# Patient Record
Sex: FEMALE
[De-identification: names, ages, dates, MRNs, and addresses within clinical notes are randomized per-mention and may not be internally consistent; named-entity substitution may affect disease eponyms.]

---

## 2019-04-18 ENCOUNTER — HOSPITAL ENCOUNTER (INPATIENT)
Dept: HOSPITAL 42 - ED | Age: 69
LOS: 1 days | Discharge: HOME | DRG: 86 | End: 2019-04-19
Attending: INTERNAL MEDICINE | Admitting: INTERNAL MEDICINE
Payer: MEDICARE

## 2019-04-18 VITALS — BODY MASS INDEX: 28.5 KG/M2

## 2019-04-18 DIAGNOSIS — M48.02: ICD-10-CM
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
## 2019-04-18 DIAGNOSIS — S02.2XXA: ICD-10-CM

## 2019-04-18 DIAGNOSIS — E78.5: ICD-10-CM

## 2019-04-18 DIAGNOSIS — S00.511A: ICD-10-CM

## 2019-04-18 DIAGNOSIS — S00.81XA: ICD-10-CM

## 2019-04-18 DIAGNOSIS — S01.21XA: ICD-10-CM

## 2019-04-18 DIAGNOSIS — Z79.890: ICD-10-CM

## 2019-04-18 DIAGNOSIS — K51.20: ICD-10-CM

## 2019-04-18 DIAGNOSIS — M43.12: ICD-10-CM

## 2019-04-18 DIAGNOSIS — I10: ICD-10-CM

## 2019-04-18 DIAGNOSIS — S06.6X0A: Primary | ICD-10-CM

## 2019-04-18 DIAGNOSIS — E03.9: ICD-10-CM

## 2019-04-18 LAB
ALBUMIN SERPL-MCNC: 4.4 G/DL (ref 3–4.8)
ALBUMIN/GLOB SERPL: 1.2 {RATIO} (ref 1.1–1.8)
ALT SERPL-CCNC: 18 U/L (ref 7–56)
APTT BLD: 34.5 SECONDS (ref 26.9–38.3)
AST SERPL-CCNC: 44 U/L (ref 14–36)
BASOPHILS # BLD AUTO: 0.01 K/MM3 (ref 0–2)
BASOPHILS NFR BLD: 0.1 % (ref 0–3)
BUN SERPL-MCNC: 23 MG/DL (ref 7–21)
CALCIUM SERPL-MCNC: 9.8 MG/DL (ref 8.4–10.5)
EOSINOPHIL # BLD: 0 10*3/UL (ref 0–0.7)
EOSINOPHIL NFR BLD: 0.4 % (ref 1.5–5)
ERYTHROCYTE [DISTWIDTH] IN BLOOD BY AUTOMATED COUNT: 12.9 % (ref 11.5–14.5)
GFR NON-AFRICAN AMERICAN: > 60
HGB BLD-MCNC: 12.5 G/DL (ref 12–16)
INR PPP: 1.08
LYMPHOCYTES # BLD: 1.4 10*3/UL (ref 1.2–3.4)
LYMPHOCYTES NFR BLD AUTO: 12.6 % (ref 22–35)
MCH RBC QN AUTO: 27.1 PG (ref 25–35)
MCHC RBC AUTO-ENTMCNC: 32.4 G/DL (ref 31–37)
MCV RBC AUTO: 83.7 FL (ref 80–105)
MONOCYTES # BLD AUTO: 0.5 10*3/UL (ref 0.1–0.6)
MONOCYTES NFR BLD: 4.8 % (ref 1–6)
PLATELET # BLD: 248 10^3/UL (ref 120–450)
PMV BLD AUTO: 9.9 FL (ref 7–11)
PROTHROMBIN TIME: 12 SECONDS (ref 9.4–12.5)
RBC # BLD AUTO: 4.61 10^6/UL (ref 3.5–6.1)
WBC # BLD AUTO: 10.8 10^3/UL (ref 4.5–11)

## 2019-04-18 PROCEDURE — 0HQ1XZZ REPAIR FACE SKIN, EXTERNAL APPROACH: ICD-10-PCS

## 2019-04-18 NOTE — CT
Date of service: 



04/18/2019



PROCEDURE:  CT Cervical Spine without contrast



HISTORY:

fall



COMPARISON:

None available.



TECHNIQUE:

Axial computed tomography images were obtained of the cervical spine 

without the use of intravenous contrast. Coronal and sagittal 

reformatted images were created and reviewed.



Radiation dose:



Total exam DLP = 464.61 mGy-cm.



This CT exam was performed using one or more of the following dose 

reduction techniques: Automated exposure control, adjustment of the 

mA and/or kV according to patient size, and/or use of iterative 

reconstruction technique.



FINDINGS:



VERTEBRAE:

There is a reversal of the cervical curvature in part due to multiple 

anterolistheses, grade 1 at the C3-4 and grade 2 at C4-5 with minimal 

retrolisthesis at C5-6.  There is advanced multilevel facet 

arthropathy which appears to be the etiology at these levels.  No 

fractures identified.  There is an additional anterolisthesis of C7 

anterior to T1, grade 1.  Multilevel spondylosis is identified though 

mild.  Gross endplate degenerative sclerosis is identified at C4-5 

with vacuum disc changes accompanying.  No destructive bony lesion is 

appreciated.



Prevertebral paraspinal soft tissues appear diffusely unremarkable.



DISCS/SPINAL CANAL/NEURAL FORAMINA:

At C2-3, there is a mild-to-moderate left neural foraminal stenosis 

caused by gross left facet joint and uncovertebral joint arthropathy 

with no significant right neural foraminal or central canal stenosis.



At C3-4, there is an additional asymmetric moderate left neural 

foraminal stenosis caused by degenerative osteophytes in the same 

distribution as above.  No significant right neural foraminal 

stenosis or central canal stenosis.  Limited grade 1 

spondylolisthesis evident. 



At C4-5, a moderate central stenosis results from grade 2 

spondylolisthesis with severe bilateral neural foraminal stenosis 

also appreciated on a degenerative basis.



At C5-6, additional minimal spondylolisthesis identified with disc 

osteophyte complex causing mild central stenosis.  Degenerative 

neural foraminal stenoses are severe at the left and moderate to 

severe at the right.



At C6-7, an irregular disc osteophyte complex is identified causing 

borderline central stenosis with moderate to severe bilateral neural 

foraminal stenoses identified on degenerative basis.



At C7-T1, no significant central canal or left neural foraminal 

stenosis.  Borderline degenerative right neural foraminal stenosis 

identified.



No gross disc herniation appreciable though MRI is more sensitive and 

can be performed for added evaluation if disc herniation is suspected 

clinically. 



OTHER FINDINGS:

None.



IMPRESSION:

No acute fracture or posttraumatic spondylolisthesis appreciated.  

The cervical curvature is reversed in part due to multilevel 

degenerative spondylolisthesis pattern as discussed above.



Multilevel degenerative neural foraminal stenoses appear advanced, 

seen worst at C6-7 where moderate to severe bilateral neural 

foraminal stenoses are identified with less severe multilevel central 

canal stenoses seen worst at C4-5 due to grade 2 spondylolisthesis 

primarily. 



Please see discussion above.

## 2019-04-18 NOTE — ED PDOC
Arrival/HPI





- General


Chief Complaint: Trauma


Time Seen by Provider: 04/18/19 15:45


Historian: Patient





- History of Present Illness


Narrative History of Present Illness (Text): 





04/18/19 16:45


69-year-old female presents today with head injury and laceration to the nose 

status post fall.  Patient states that she was rushing to get to her piano 

lesson and she tripped and fell hitting her face on the ground.  She denies loss

of consciousness.  Patient states she broke her glasses and sustained a 

laceration to the nose.  She denies fevers or chills.  No chest pain or 

shortness of breath.  She denies abdominal pain.  No nausea vomiting diarrhea or

constipation.  She denies neck or back pain. pt denies headache dizziness or 

weakness.  Patient states her last tetanus shot was 2 years ago.  











Past Medical History





- Provider Review


Nursing Documentation Reviewed: Yes





- Travel History


Have you recently traveled outside [place] w/in the past 3 mons?: No





- Infectious Disease


Hx of Infectious Diseases: None





- Tetanus Immunization


Tetanus Immunization: Up to Date





- Cardiac


Hx Hypertension: Yes





- Pulmonary


Hx Asthma: Yes





- HEENT


Hx Cataracts: Yes





- Hematological/Oncological


Other/Comment: Skin Ca - nose





- Integumentary


Other/Comment: Skin CA





- Psychiatric


Hx Substance Use: No





Family/Social History





- Physician Review


Nursing Documentation Reviewed: Yes


Family/Social History: Unknown Family HX


Smoking Status: Never Smoked


Hx Alcohol Use: No


Hx Substance Use: No





Allergies/Home Meds


Allergies/Adverse Reactions: 


Allergies





Citrus And Derivatives Allergy (Verified 09/21/18 07:52)


   RASH


grass pollen Allergy (Verified 09/21/18 07:52)


   RASH


wool Allergy (Verified 09/21/18 07:52)


   RASH








Home Medications: 


                                    Home Meds











 Medication  Instructions  Recorded  Confirmed


 


Levothyroxine [Synthroid] 50 mcg PO DAILY 11/11/16 09/21/18


 


Rosuvastatin Calcium [Crestor] 10 mg PO DAILY 11/11/16 09/21/18


 


Valsartan [Diovan] 320 mg PO DAILY 11/11/16 09/21/18


 


amLODIPine [Norvasc] 5 mg PO DAILY 11/11/16 09/21/18














Review of Systems





- Review of Systems


Constitutional: absent: Fatigue, Fevers


Eyes: absent: Vision Changes, Photophobia, Eye Pain


ENT: Sinus Congestion.  absent: Sore Throat


Respiratory: absent: SOB, Cough


Cardiovascular: absent: Chest Pain, Palpitations


Gastrointestinal: absent: Abdominal Pain, Constipation, Diarrhea, Nausea, 

Vomiting


Musculoskeletal: absent: Arthralgias, Back Pain, Neck Pain


Skin: Laceration (To bridge of the nose), Other (Abrasions)


Neurological: absent: Headache, Dizziness


Psychiatric: absent: Anxiety, Depression





Physical Exam


Vital Signs Reviewed: Yes


Temperature: Afebrile


Blood Pressure: Normal


Pulse: Regular


Respiratory Rate: Normal


Appearance: Positive for: Well-Appearing, Non-Toxic, Comfortable


Pain Distress: None


Mental Status: Positive for: Alert and Oriented X 3





- Systems Exam


Head: Present: Abrasion (Abrasion to the upper lip just inferior to the left 

nasal opening.   abrasion to the left side of the forehead.).  No: Tenderness, 

Swelling, Ecchymosis


Pupils: Present: PERRL


Extroacular Muscles: Present: EOMI


Conjunctiva: Present: Normal


Ears: Present: Normal, NORMAL TM


Mouth: Present: Moist Mucous Membranes, Normal Lips, Normal Tounge.  No: 

Drooling, Trismus


Pharnyx: Present: Normal.  No: ERYTHEMA


Nose (External): Present: Laceration (2.5cm laceration and abrasion to bridge of

 nose. + edema. ).  No: Atraumatic


Nose (Internal): Present: Normal Inspection, No Active Bleeding.  No: Engorged, 

Clear Mucous, Rhinorrhea, Septal Hematoma, Epistaxis


Neck: Present: Normal Range of Motion, Trachea Midline.  No: MIDLINE TENDERNESS,

 Paraspinal Tenderness


Respiratory/Chest: Present: Clear to Auscultation, Good Air Exchange.  No: 

Respiratory Distress, Accessory Muscle Use, Tender to Palpation


Cardiovascular: Present: Regular Rate and Rhythm


Abdomen: No: Tenderness, Rebound, Guarding


Back: Present: Normal Inspection.  No: Midline Tenderness, Paraspinal Tenderness


Upper Extremity: Present: Normal Inspection, Normal ROM


Lower Extremity: Present: Normal Inspection, Normal ROM.  No: Tenderness


Neurological: Present: GCS=15, Speech Normal, Motor Func Grossly Intact, Normal 

Sensory Function, Gait Normal, Memory Normal


Skin: Present: Warm, Dry, Normal Color


Psychiatric: Present: Alert, Oriented x 3





Medical Decision Making


ED Course and Treatment: 





04/18/19 16:50


69-year-old female presenting today laceration to the nose and head injury 

status post mechanical fall








Patient is nontoxic well-appearing in no distress.





Patient's tetanus is up-to-date.








CAT scan of the head:FINDINGS:


HEMORRHAGE:


Punctate hyperdensity at the level of the anterior falx (series 4, image 23); 

small subarachnoid hemorrhage must be considered. 


BRAIN:


Diffuse atrophy with prominence of the ventricles and sulci noted. No mass 

effect or edema.  Scattered periventricular and subcortical white matter 

hypodensities, which are nonspecific, but often seen with chronic microvascular 

ischemic disease. 


VENTRICLES:


No hydrocephalus. 


CALVARIUM:


Unremarkable.


PARANASAL SINUSES:


Unremarkable as visualized. No significant inflammatory changes.


MASTOID AIR CELLS:


Unremarkable as visualized. No inflammatory changes.


OTHER FINDINGS:


Deformity of the left nasal bone consistent with mildly displaced fracture. 

Associated soft tissue swelling, laceration and punctate radiopaque take foreign

 body/debris.


IMPRESSION:


Punctate hyperdensity at the level of the anterior falx; small subarachnoid 

hemorrhage must be considered. Recommend 4-6 hours follow-up CT in order to 

assess for stability or resolution. 


Deformity of the left nasal bone appears consistent with mildly displaced 

fracture. Associated soft tissue swelling, laceration, punctate radiopaque 

foreign body/debris. 








CAT scan of the maxillofacial bones:Findings: 


Streak artifact from dental hardware. Deformity of the nasal bones consistent 

with mildly displaced left nasal bone fracture and nondisplaced right nasal bone

 fracture.  Associated soft tissue swelling, laceration, punctate radiopaque 

foreign body/debris. Remainder the visualized osseous structures appear intact 

without acute displaced fracture identified.  The orbits appear unremarkable.  

The temporomandibular joints appear located.  The mastoid air cells appear 

clear.  The paranasal sinuses appear clear.    The soft tissues appear 

unremarkable. Degenerative changes of the included cervical spine. 5 mm 

anterolisthesis of C4 on C5. 


Impression: 


Streak artifact from dental hardware. Deformity of the nasal bones consistent 

with mildly displaced left nasal bone fracture and nondisplaced right nasal bone

 fracture.  Associated soft tissue swelling, laceration, punctate radiopaque 

foreign body/debris. 


5 mm anterolisthesis of C4 on C5. Correlate clinically. If indicated, recommend 

further evaluation with dedicated cross-sectional imaging. 











ct c-spine; FINDINGS:


VERTEBRAE:


There is a reversal of the cervical curvature in part due to multiple 

anterolistheses, grade 1 at the C3-4 and grade 2 at C4-5 with minimal 

retrolisthesis at C5-6.  There is advanced multilevel facet arthropathy which 

appears to be the etiology at these levels.  No fractures identified.  There is 

an additional anterolisthesis of C7 anterior to T1, grade 1.  Multilevel 

spondylosis is identified though mild.  Gross endplate degenerative sclerosis is

 identified at C4-5 with vacuum disc changes accompanying.  No destructive bony 

lesion is appreciated.


Prevertebral paraspinal soft tissues appear diffusely unremarkable.


DISCS/SPINAL CANAL/NEURAL FORAMINA:


At C2-3, there is a mild-to-moderate left neural foraminal stenosis caused by 

gross left facet joint and uncovertebral joint arthropathy with no significant 

right neural foraminal or central canal stenosis.


At C3-4, there is an additional asymmetric moderate left neural foraminal 

stenosis caused by degenerative osteophytes in the same distribution as above.  

No significant right neural foraminal stenosis or central canal stenosis.  

Limited grade 1 spondylolisthesis evident. 


At C4-5, a moderate central stenosis results from grade 2 spondylolisthesis with

 severe bilateral neural foraminal stenosis also appreciated on a degenerative 

basis.


At C5-6, additional minimal spondylolisthesis identified with disc osteophyte 

complex causing mild central stenosis.  Degenerative neural foraminal stenoses 

are severe at the left and moderate to severe at the right.


At C6-7, an irregular disc osteophyte complex is identified causing borderline 

central stenosis with moderate to severe bilateral neural foraminal stenoses 

identified on degenerative basis.


At C7-T1, no significant central canal or left neural foraminal stenosis.  

Borderline degenerative right neural foraminal stenosis identified.


No gross disc herniation appreciable though MRI is more sensitive and can be 

performed for added evaluation if disc herniation is suspected clinically. 


OTHER FINDINGS:


None.


IMPRESSION:


No acute fracture or posttraumatic spondylolisthesis appreciated.  The cervical 

curvature is reversed in part due to multilevel degenerative spondylolisthesis 

pattern as discussed above.


Multilevel degenerative neural foraminal stenoses appear advanced, seen worst at

 C6-7 where moderate to severe bilateral neural foraminal stenoses are 

identified with less severe multilevel central canal stenoses seen worst at C4-5

 due to grade 2 spondylolisthesis primarily. 


Please see discussion above.








Case was discussed with Dr. Spring (neurosurgeon) in depth.  He reviewed 

the images and advised observation and repeat CAT scan. 





Patient is nontoxic well appearing in no distress. Vital signs are stable. 

ambulating with steady gait. no neuro deficits.








all wounds cleaned and irrigated. 


nasal laceration irrigated well with high pressure irrigation.  There was no 

visualized foreign body upon inspection prior to or after high-pressure 

irrigation. 





Patient was given 1 g of Ancef IV prophylactically for nasal bone fracture with 

laceration.





Laceration repair: 7 sutures placed. 





Bacitracin and dressing applied





Case discussed with Dr. Sandhu in depth accepts admission to the ICU for every 

hour neurochecks for possible traumatic subarachnoid hemorrhage.








All results were discussed in depth with the patient.  Patient's primary care 

physician is Dr. Suarez.  Patient will be admitted to the hospitalist service.








All aspects of this case were discussed the attending of record.











Impression: Subarachnoid hemorrhage, Laceration, nose,  nasal bone fracture, 

head injury,


admit to ICU





- RAD Interpretation


Radiology Orders: 











04/18/19 15:57


HEAD W/O CONTRAST [CT] Stat 


MAXILLOFACIAL W/O CONTRAST [CT] Stat 














Procedure: Wound Repair





- Procedure


Procedure: Wound Repair: nose





- Performed by


Performed by: Mid-level Provider





- Indications


Indication(s):: Laceration





- Location


Location:: Nose


Shape:: Linear (laceration with laceration, jagged edges)


Depth:: Epidermis





- Anesthetic Technique


Local/Regional Anesthetic:: Lidocaine 1% (1cc)





- Debris


Debris:: None





- Irrigated


Irrigated with ml of normal saline: copious amounts of NS using high pressure 

irrigation





- Complexity


Complexity:: Simple (one layer)





- Wound repair method


Sutures:: # (7), Size (6.0), Type (prolene), Technique (interrupted)





- Complications


Complications: none





- Patient tolerated procedure


Patient Tolerated Procedure:: Well





Disposition/Present on Arrival





- Present on Arrival


Any Indicators Present on Arrival: No


History of DVT/PE: No


History of Uncontrolled Diabetes: No


Urinary Catheter: No


History of Decub. Ulcer: No


History Surgical Site Infection Following: None





- Disposition


Have Diagnosis and Disposition been Completed?: Yes


Diagnosis: 


 Subarachnoid hemorrhage, Nasal bone fracture, Laceration of nose





Disposition: HOSPITALIZED


Disposition Time: 16:54


Patient Plan: Admission, ICU


Patient Problems: 


                             Current Active Problems











Problem Status Onset


 


Laceration of nose Acute 


 


Nasal bone fracture Acute 


 


Subarachnoid hemorrhage Acute 











Condition: CRITICAL


Referrals: 


Romeo Suarez MD [Primary Care Provider] - Follow up with primary


Forms:  [employer] (English)

## 2019-04-18 NOTE — CP.PCM.HP
<Ramón Heath - Last Filed: 04/18/19 21:55>





History of Present Illness





- History of Present Illness


History of Present Illness: 





Hospitalist H&P for Dr. Sandhu





CC: Fall





Patient is a 68 yo F with PMH of HTN, Hypothyroidism, HLD, ulcerative proctitis,

and cataracts presents to [facility] after mechanical fall at home. Patient staets that

she was rushing to get to her piano lession and tripped. Patient fell and hit 

her face on the ground. Patient denies any LOC, dizziness, confusion, change in 

vision/hearing, neck/back pain, or weakness after the fall. Patient sustained a 

laceration to her nose. Furthermore, patient denied CP, SOB, n/v/d, abdominal 

pain, fever, chills, HA, or dizziness.





PMH: HTN, Hypothyroidism, HLD, ulcerative proctitis, cataracts


Surg: Cataract surgery, hysterectomy, skin CA removal from nose


All: Macrobid


SH: Denied EtOH, tobacco, and illicit drug use


FHx: Ulcerative colitis and Colon CA


Medications: as per MAR


PMD: Dedousis








Present on Admission





- Present on Admission


Any Indicators Present on Admission: No





Review of Systems





- Review of Systems


All systems: reviewed and no additional remarkable complaints except (12 point 

ROS reviewed and is negative other than what is stated in HPI)





Past Patient History





- Infectious Disease


Hx of Infectious Diseases: None





- Tetanus Immunizations


Tetanus Immunization: Up to Date





- Past Social History


Smoking Status: Never Smoked





- CARDIAC


Hx Hypertension: Yes





- PULMONARY


Hx Asthma: Yes





- HEENT


Hx Cataracts: Yes





- HEMATOLOGICAL/ONCOLOGICAL


Other/Comment: Skin Ca - nose





- INTEGUMENTARY


Other/Comment: Skin CA





- PSYCHIATRIC


Hx Substance Use: No





Meds


Allergies/Adverse Reactions: 


                                    Allergies











Allergy/AdvReac Type Severity Reaction Status Date / Time


 


Citrus And Derivatives Allergy  RASH Verified 09/21/18 07:52


 


grass pollen Allergy  RASH Verified 09/21/18 07:52


 


nitrofurantoin Allergy  RASH Verified 04/18/19 20:41





[From Macrobid]     


 


wool Allergy  RASH Verified 09/21/18 07:52














Physical Exam





- Constitutional


Appears: No Acute Distress





- Head Exam


Head Exam: absent: ATRAUMATIC (Abrasion upper lip, inferior to left nostril; 

abraion to left side of forehead)





- Eye Exam


Eye Exam: EOMI, Normal appearance, PERRL





- ENT Exam


ENT Exam: Mucous Membranes Moist


Additional comments: 





2.5 cm Laceration to bridge of nose with edema





- Neck Exam


Neck exam: Positive for: Normal Inspection





- Respiratory Exam


Respiratory Exam: Clear to Auscultation Bilateral.  absent: Rales, Rhonchi, 

Wheezes





- Cardiovascular Exam


Cardiovascular Exam: RRR, +S1, +S2.  absent: Diastolic murmur, Gallop, Rubs, 

Systolic Murmur





- GI/Abdominal Exam


GI & Abdominal Exam: Normal Bowel Sounds, Soft.  absent: Distended, Guarding, 

Rebound, Tenderness





- Extremities Exam


Extremities exam: Positive for: normal inspection





- Back Exam


Back exam: NORMAL INSPECTION





- Neurological Exam


Neurological exam: Alert, CN II-XII Intact, Oriented x3





- Psychiatric Exam


Psychiatric exam: Normal Affect, Normal Mood





- Skin


Skin Exam: Normal Color, Warm





Results





- Vital Signs


Recent Vital Signs: 





                                Last Vital Signs











Temp  97.9 F   04/18/19 16:56


 


Pulse  81   04/18/19 16:56


 


Resp  16   04/18/19 16:56


 


BP  110/75   04/18/19 16:56


 


Pulse Ox  100   04/18/19 16:56














- Labs


Result Diagrams: 


                                 04/18/19 19:30





                                 04/18/19 19:30


Labs: 





                         Laboratory Results - last 24 hr











  04/18/19 04/18/19 04/18/19





  19:30 19:30 19:30


 


WBC    10.8


 


RBC    4.61


 


Hgb    12.5


 


Hct    38.6


 


MCV    83.7


 


MCH    27.1


 


MCHC    32.4


 


RDW    12.9


 


Plt Count    248


 


MPV    9.9


 


Neut % (Auto)    82.1 H


 


Lymph % (Auto)    12.6 L


 


Mono % (Auto)    4.8


 


Eos % (Auto)    0.4 L


 


Baso % (Auto)    0.1


 


Lymph # (Auto)    1.4


 


Mono # (Auto)    0.5


 


Eos # (Auto)    0.0


 


Baso # (Auto)    0.01


 


Absolute Neuts (auto)    8.83 H


 


PT  12.0  


 


INR  1.08  


 


APTT  34.5  


 


Sodium   140 


 


Potassium   4.4 


 


Chloride   103 


 


Carbon Dioxide   27 


 


Anion Gap   15 


 


BUN   23 H 


 


Creatinine   0.6 L 


 


Est GFR ( Amer)   > 60 


 


Est GFR (Non-Af Amer)   > 60 


 


Random Glucose   97 


 


Calcium   9.8 


 


Total Bilirubin   0.8 


 


AST   44 H 


 


ALT   18 


 


Alkaline Phosphatase   85 


 


Total Protein   8.0 


 


Albumin   4.4 


 


Globulin   3.6 


 


Albumin/Globulin Ratio   1.2 


 


BBK History Checked   














  04/18/19





  20:21


 


WBC 


 


RBC 


 


Hgb 


 


Hct 


 


MCV 


 


MCH 


 


MCHC 


 


RDW 


 


Plt Count 


 


MPV 


 


Neut % (Auto) 


 


Lymph % (Auto) 


 


Mono % (Auto) 


 


Eos % (Auto) 


 


Baso % (Auto) 


 


Lymph # (Auto) 


 


Mono # (Auto) 


 


Eos # (Auto) 


 


Baso # (Auto) 


 


Absolute Neuts (auto) 


 


PT 


 


INR 


 


APTT 


 


Sodium 


 


Potassium 


 


Chloride 


 


Carbon Dioxide 


 


Anion Gap 


 


BUN 


 


Creatinine 


 


Est GFR ( Amer) 


 


Est GFR (Non-Af Amer) 


 


Random Glucose 


 


Calcium 


 


Total Bilirubin 


 


AST 


 


ALT 


 


Alkaline Phosphatase 


 


Total Protein 


 


Albumin 


 


Globulin 


 


Albumin/Globulin Ratio 


 


BBK History Checked  No verified bt














Assessment & Plan





- Assessment and Plan (Free Text)


Assessment: 





68 yo F with PMH of HTN, Hypothyroidism, HLD, ulcerative proctitis, and 

cataracts presents to ED s/p mechanical fall. Patient will be admitted to ICU 

for evaluation and treatment for possible SAH found on CT requiring q1h 

neurochecks.





Plan: 





1. Mechanical Fall


- Admit to ICU for q1h neurochecks


- Head CT showed punctate hyperdensity at the level of anterior falx, cannot r/o

SAH


- Head CT ordered in AM; r/o SAH


- Cervical CT showed no fracture or posttraumatic spondylolisthesis, multilevel 

degenerative neural foraminal stenoses, grade 2 spondylolisthesis C4-5


- Maxillofacial CT showed mildly displaced left nasal bone fracture and 

nondisplaced right nasal bone fracture, possible foreign body


- Nasal laceration was irrigated, cleaned, and sutured in ED without evidence of

foreign body


- Remove sutures in 5-7 days


- Cont Ancef for foreign body ppx


- ENT follow up outpatient


- Neurology Consulted


- Neurosurgery Consulted





2. HTN


- Cont Norvasc and Valsartan





3. Hypothyroidism


- Cont Levothyroxine





4. HLD


- Cont Lipitor





GI/DVT PPx


- Protonix


- SCDs





Patient discussed in detail with Dr. Sandhu.


Jason Heath, DO PGY2








<Kamala Sandhu - Last Filed: 04/19/19 19:58>





Results





- Vital Signs


Recent Vital Signs: 





                                Last Vital Signs











Temp  98.6 F   04/19/19 07:00


 


Pulse  77   04/19/19 16:00


 


Resp  19   04/19/19 16:00


 


BP  119/73   04/19/19 16:00


 


Pulse Ox  98   04/19/19 15:00














- Labs


Result Diagrams: 


                                 04/19/19 05:10





                                 04/19/19 05:10


Labs: 





                         Laboratory Results - last 24 hr











  04/18/19 04/18/19 04/18/19





  19:30 19:30 20:21


 


WBC   


 


RBC   


 


Hgb   


 


Hct   


 


MCV   


 


MCH   


 


MCHC   


 


RDW   


 


Plt Count   


 


MPV   


 


Neut % (Auto)   


 


Lymph % (Auto)   


 


Mono % (Auto)   


 


Eos % (Auto)   


 


Baso % (Auto)   


 


Lymph # (Auto)   


 


Mono # (Auto)   


 


Eos # (Auto)   


 


Baso # (Auto)   


 


Absolute Neuts (auto)   


 


PT  12.0  


 


INR  1.08  


 


APTT  34.5  


 


Sodium   140 


 


Potassium   4.4 


 


Chloride   103 


 


Carbon Dioxide   27 


 


Anion Gap   15 


 


BUN   23 H 


 


Creatinine   0.6 L 


 


Est GFR ( Amer)   > 60 


 


Est GFR (Non-Af Amer)   > 60 


 


Random Glucose   97 


 


Calcium   9.8 


 


Magnesium   


 


Total Bilirubin   0.8 


 


AST   44 H 


 


ALT   18 


 


Alkaline Phosphatase   85 


 


Total Protein   8.0 


 


Albumin   4.4 


 


Globulin   3.6 


 


Albumin/Globulin Ratio   1.2 


 


25-OH Vitamin D Total   


 


Blood Type    B POSITIVE


 


Blood Type Confirm   


 


Antibody Screen    Negative


 


BBK History Checked    No verified bt














  04/18/19 04/19/19 04/19/19





  20:40 05:10 05:10


 


WBC   7.1  D 


 


RBC   4.34 


 


Hgb   11.5 L 


 


Hct   36.2 


 


MCV   83.4 


 


MCH   26.5 


 


MCHC   31.8 


 


RDW   12.9 


 


Plt Count   249 


 


MPV   9.6 


 


Neut % (Auto)   73.4 H 


 


Lymph % (Auto)   20.3 L 


 


Mono % (Auto)   5.5 


 


Eos % (Auto)   0.7 L 


 


Baso % (Auto)   0.1 


 


Lymph # (Auto)   1.4 


 


Mono # (Auto)   0.4 


 


Eos # (Auto)   0.1 


 


Baso # (Auto)   0.01 


 


Absolute Neuts (auto)   5.19 


 


PT   


 


INR   


 


APTT   


 


Sodium    140


 


Potassium    3.6


 


Chloride    101


 


Carbon Dioxide    30


 


Anion Gap    13


 


BUN    16


 


Creatinine    0.6 L


 


Est GFR ( Amer)    > 60


 


Est GFR (Non-Af Amer)    > 60


 


Random Glucose    102


 


Calcium    9.5


 


Magnesium    2.1


 


Total Bilirubin    0.7


 


AST    44 H


 


ALT    23


 


Alkaline Phosphatase    87


 


Total Protein    7.3


 


Albumin    4.1


 


Globulin    3.3


 


Albumin/Globulin Ratio    1.2


 


25-OH Vitamin D Total   


 


Blood Type   


 


Blood Type Confirm  B POSITIVE  


 


Antibody Screen   


 


BBK History Checked   














  04/19/19





  10:30


 


WBC 


 


RBC 


 


Hgb 


 


Hct 


 


MCV 


 


MCH 


 


MCHC 


 


RDW 


 


Plt Count 


 


MPV 


 


Neut % (Auto) 


 


Lymph % (Auto) 


 


Mono % (Auto) 


 


Eos % (Auto) 


 


Baso % (Auto) 


 


Lymph # (Auto) 


 


Mono # (Auto) 


 


Eos # (Auto) 


 


Baso # (Auto) 


 


Absolute Neuts (auto) 


 


PT 


 


INR 


 


APTT 


 


Sodium 


 


Potassium 


 


Chloride 


 


Carbon Dioxide 


 


Anion Gap 


 


BUN 


 


Creatinine 


 


Est GFR ( Amer) 


 


Est GFR (Non-Af Amer) 


 


Random Glucose 


 


Calcium 


 


Magnesium 


 


Total Bilirubin 


 


AST 


 


ALT 


 


Alkaline Phosphatase 


 


Total Protein 


 


Albumin 


 


Globulin 


 


Albumin/Globulin Ratio 


 


25-OH Vitamin D Total  22.8 L


 


Blood Type 


 


Blood Type Confirm 


 


Antibody Screen 


 


BBK History Checked 














Attending/Attestation





- Attestation


I have personally seen and examined this patient.: Yes


I have fully participated in the care of the patient.: Yes


I have reviewed all pertinent clinical information: Yes


Notes (Text): 





04/19/19 19:57


seen and examined. Discussed with resident. A&P as above. Ct shows foreign 

object in nose. pt. needs to F/U plastic surgeon as O/P.

## 2019-04-18 NOTE — CT
Date of service: 04/18/2019



CT maxillofacial bones without IV contrast 



Indication: fall, facial injury, nasal laceration



Comparison: Noncontrast head CT performed the same day.



Technique: 



Axial computed tomography images were obtained of the maxillofacial 

bones without the use of intravenous contrast. Coronal and sagittal 

reformatted images were generated and reviewed. 



This CT exam was performed using 1 or more of the following dose 

reduction techniques: Automated exposure control, adjustment of the 

MAA and/or kV according to patient size, and/or use of iterative 

reconstruction technique. 



Radiation dose:



Total exam DLP = 706.96 mGy-cm.



Findings: 



Streak artifact from dental hardware. Deformity of the nasal bones 

consistent with mildly displaced left nasal bone fracture and 

nondisplaced right nasal bone fracture.  Associated soft tissue 

swelling, laceration, punctate radiopaque foreign body/debris. 

Remainder the visualized osseous structures appear intact without 

acute displaced fracture identified.  The orbits appear unremarkable. 

 The temporomandibular joints appear located.  The mastoid air cells 

appear clear.  The paranasal sinuses appear clear.    The soft 

tissues appear unremarkable. Degenerative changes of the included 

cervical spine. 5 mm anterolisthesis of C4 on C5. 



Impression: 



Streak artifact from dental hardware. Deformity of the nasal bones 

consistent with mildly displaced left nasal bone fracture and 

nondisplaced right nasal bone fracture.  Associated soft tissue 

swelling, laceration, punctate radiopaque foreign body/debris. 



5 mm anterolisthesis of C4 on C5. Correlate clinically. If indicated, 

recommend further evaluation with dedicated cross-sectional imaging. 



Findings discussed with YUAN Diez on 4/18/19 at 6:05 p.m.

## 2019-04-18 NOTE — CT
Date of service: 04/18/2019



PROCEDURE:  CT HEAD WITHOUT CONTRAST.



HISTORY:

fall, head injury



COMPARISON:

None available



TECHNIQUE:

Axial computed tomography images were obtained through the head/brain 

without intravenous contrast.  



Radiation dose:



Total exam DLP = 852.55 mGy-cm.



This CT exam was performed using one or more of the following dose 

reduction techniques: Automated exposure control, adjustment of the 

mA and/or kV according to patient size, and/or use of iterative 

reconstruction technique.



FINDINGS:



HEMORRHAGE:

Punctate hyperdensity at the level of the anterior falx (series 4, 

image 23); small subarachnoid hemorrhage must be considered. 



BRAIN:

Diffuse atrophy with prominence of the ventricles and sulci noted. No 

mass effect or edema.  Scattered periventricular and subcortical 

white matter hypodensities, which are nonspecific, but often seen 

with chronic microvascular ischemic disease. 



VENTRICLES:

No hydrocephalus. 



CALVARIUM:

Unremarkable.



PARANASAL SINUSES:

Unremarkable as visualized. No significant inflammatory changes.



MASTOID AIR CELLS:

Unremarkable as visualized. No inflammatory changes.



OTHER FINDINGS:

Deformity of the left nasal bone consistent with mildly displaced 

fracture. Associated soft tissue swelling, laceration and punctate 

radiopaque take foreign body/debris.



IMPRESSION:

Punctate hyperdensity at the level of the anterior falx; small 

subarachnoid hemorrhage must be considered. Recommend 4-6 hours 

follow-up CT in order to assess for stability or resolution. 



Deformity of the left nasal bone appears consistent with mildly 

displaced fracture. Associated soft tissue swelling, laceration, 

punctate radiopaque foreign body/debris. 



Findings discussed with YUAN Diez on 4/18/19 at 5:47 p.m.

## 2019-04-19 VITALS
SYSTOLIC BLOOD PRESSURE: 119 MMHG | HEART RATE: 77 BPM | OXYGEN SATURATION: 98 % | DIASTOLIC BLOOD PRESSURE: 73 MMHG | RESPIRATION RATE: 19 BRPM

## 2019-04-19 VITALS — TEMPERATURE: 98.6 F

## 2019-04-19 LAB
ALBUMIN SERPL-MCNC: 4.1 G/DL (ref 3–4.8)
ALBUMIN/GLOB SERPL: 1.2 {RATIO} (ref 1.1–1.8)
ALT SERPL-CCNC: 23 U/L (ref 7–56)
AST SERPL-CCNC: 44 U/L (ref 14–36)
BASOPHILS # BLD AUTO: 0.01 K/MM3 (ref 0–2)
BASOPHILS NFR BLD: 0.1 % (ref 0–3)
BUN SERPL-MCNC: 16 MG/DL (ref 7–21)
CALCIUM SERPL-MCNC: 9.5 MG/DL (ref 8.4–10.5)
EOSINOPHIL # BLD: 0.1 10*3/UL (ref 0–0.7)
EOSINOPHIL NFR BLD: 0.7 % (ref 1.5–5)
ERYTHROCYTE [DISTWIDTH] IN BLOOD BY AUTOMATED COUNT: 12.9 % (ref 11.5–14.5)
GFR NON-AFRICAN AMERICAN: > 60
HGB BLD-MCNC: 11.5 G/DL (ref 12–16)
LYMPHOCYTES # BLD: 1.4 10*3/UL (ref 1.2–3.4)
LYMPHOCYTES NFR BLD AUTO: 20.3 % (ref 22–35)
MCH RBC QN AUTO: 26.5 PG (ref 25–35)
MCHC RBC AUTO-ENTMCNC: 31.8 G/DL (ref 31–37)
MCV RBC AUTO: 83.4 FL (ref 80–105)
MONOCYTES # BLD AUTO: 0.4 10*3/UL (ref 0.1–0.6)
MONOCYTES NFR BLD: 5.5 % (ref 1–6)
PLATELET # BLD: 249 10^3/UL (ref 120–450)
PMV BLD AUTO: 9.6 FL (ref 7–11)
RBC # BLD AUTO: 4.34 10^6/UL (ref 3.5–6.1)
WBC # BLD AUTO: 7.1 10^3/UL (ref 4.5–11)

## 2019-04-19 RX ADMIN — CEFAZOLIN SCH MLS/HR: 330 INJECTION, POWDER, FOR SOLUTION INTRAMUSCULAR; INTRAVENOUS at 06:45

## 2019-04-19 RX ADMIN — CEFAZOLIN SCH MLS/HR: 330 INJECTION, POWDER, FOR SOLUTION INTRAMUSCULAR; INTRAVENOUS at 14:44

## 2019-04-19 NOTE — CP.PCM.PCO
Physician Communication Note





- Physician Communication Note


Physician Communication Note: Pt. cleared from neurosurgery, repeat head ct neg 

hemorrage,ent consult pen

## 2019-04-19 NOTE — CP.CCUPN
<JamesKana - Last Filed: 04/19/19 10:25>





CCU Subjective





- Physician Review


Events Since Last Encounter (Free Text): 





04/19/19 07:12


no acute events overnight


Subjective (Free Text): 





04/19/19 07:15


Pt seen and examined this morning, pt denies headaches, nausea, vomiting, 

dizziness, visual changes





CCU Objective





- Vital Signs / Intake & Output


Vital Signs (Last 4 hours): 


Vital Signs











  Temp Pulse Resp BP Pulse Ox


 


 04/19/19 04:00  98.3 F  74  20  115/73  99











Intake and Output (Last 8hrs): 


                                 Intake & Output











 04/18/19 04/19/19 04/19/19





 22:59 06:59 14:59


 


Weight 156 lb  














- Physical Exam


Physical Exam Limitations: Negative for: Altered Mental Status


Head: Positive for: Atraumatic, Normocephalic, Abrasion (Abrasion to the upper 

lip just inferior to the left nasal opening.   abrasion to the left side of the 

forehead.).  Negative for: Tenderness, Swelling, Ecchymosis


Pupils: Positive for: PERRL


Extroacular Muscles: Positive for: EOMI


Conjunctiva: Positive for: Normal


Ears: Positive for: Normal, NORMAL TM


Mouth: Positive for: Moist Mucous Membranes, Normal Lips, Normal Tounge.  

Negative for: Drooling, Trismus


Pharnyx: Positive for: Normal.  Negative for: ERYTHEMA


Nose (External): Positive for: Laceration (2.5cm laceration and abrasion to 

bridge of nose. + edema. ).  Negative for: Atraumatic


Nose (Internal): Positive for: Normal Inspection, No Active Bleeding.  Negative 

for: Engorged, Clear Mucous, Rhinorrhea, Septal Hematoma, Epistaxis


Neck: Positive for: Normal Range of Motion, Trachea Midline.  Negative for: 

MIDLINE TENDERNESS, Paraspinal Tenderness


Respiratory/Chest: Positive for: Clear to Auscultation, Good Air Exchange.  

Negative for: Respiratory Distress, Accessory Muscle Use, Tender to Palpation


Cardiovascular: Positive for: Regular Rate and Rhythm


Abdomen: Negative for: Tenderness, Rebound, Guarding


Back: Positive for: Normal Inspection.  Negative for: Midline Tenderness, 

Paraspinal Tenderness


Upper Extremity: Positive for: Normal Inspection, Normal ROM


Lower Extremity: Positive for: Normal Inspection, Normal ROM.  Negative for: 

Tenderness


Neurological: Positive for: GCS=15, CN II-XII Intact, Speech Normal, Motor Func 

Grossly Intact, Normal Sensory Function, Gait Normal, Memory Normal


Skin: Positive for: Warm, Dry, Normal Color


Psychiatric: Positive for: Alert, Oriented x 3





- Medications


Active Medications: 


Active Medications











Generic Name Dose Route Start Last Admin





  Trade Name Freq  PRN Reason Stop Dose Admin


 


Amlodipine Besylate  5 mg  04/19/19 10:00  





  Norvasc  PO   





  DAILY ALVIN   





     





     





     





     


 


Atorvastatin Calcium  40 mg  04/19/19 10:00  





  Lipitor  PO   





  DAILY ALVIN   





     





     





     





     


 


Cefazolin Sodium  1 gm in 100 mls @ 100 mls/hr  04/19/19 06:00  04/19/19 06:45





  Ancef 1gm In Ns  IVPB   100 mls/hr





  Q8 ALVIN   Administration





     





     





  Protocol   





     


 


Levothyroxine Sodium  50 mcg  04/19/19 10:00  





  Synthroid  PO   





  DAILY ALVIN   





     





     





     





     


 


Pantoprazole Sodium  40 mg  04/19/19 06:00  04/19/19 06:46





  Protonix Ec Tab  PO   40 mg





  0600 ALVIN   Administration





     





     





     





     


 


Valsartan  320 mg  04/19/19 10:00  





  Diovan  PO   





  DAILY ALVIN   





     





     





     





     














- Patient Studies


Lab Studies: 


                                   Lab Studies











  04/19/19 04/19/19 04/18/19 Range/Units





  05:10 05:10 20:40 


 


WBC   7.1  D   (4.5-11.0)  10^3/uL


 


RBC   4.34   (3.5-6.1)  10^6/uL


 


Hgb   11.5 L   (12.0-16.0)  g/dL


 


Hct   36.2   (36.0-48.0)  %


 


MCV   83.4   (80.0-105.0)  fl


 


MCH   26.5   (25.0-35.0)  pg


 


MCHC   31.8   (31.0-37.0)  g/dl


 


RDW   12.9   (11.5-14.5)  %


 


Plt Count   249   (120.0-450.0)  10^3/uL


 


MPV   9.6   (7.0-11.0)  fl


 


Neut % (Auto)   73.4 H   (50.0-68.0)  %


 


Lymph % (Auto)   20.3 L   (22.0-35.0)  %


 


Mono % (Auto)   5.5   (1.0-6.0)  %


 


Eos % (Auto)   0.7 L   (1.5-5.0)  %


 


Baso % (Auto)   0.1   (0.0-3.0)  %


 


Lymph # (Auto)   1.4   (1.2-3.4)  


 


Mono # (Auto)   0.4   (0.1-0.6)  


 


Eos # (Auto)   0.1   (0.0-0.7)  


 


Baso # (Auto)   0.01   (0.0-2.0)  K/mm3


 


Absolute Neuts (auto)   5.19   (1.4-6.5)  


 


PT     (9.4-12.5)  SECONDS


 


INR     


 


APTT     (26.9-38.3)  Seconds


 


Sodium  140    (132-148)  mmol/L


 


Potassium  3.6    (3.6-5.0)  mmol/L


 


Chloride  101    ()  mmol/L


 


Carbon Dioxide  30    (21-33)  mmol/L


 


Anion Gap  13    (10-20)  


 


BUN  16    (7-21)  mg/dL


 


Creatinine  0.6 L    (0.7-1.2)  mg/dl


 


Est GFR (African Amer)  > 60    


 


Est GFR (Non-Af Amer)  > 60    


 


Random Glucose  102    ()  mg/dL


 


Calcium  9.5    (8.4-10.5)  mg/dL


 


Magnesium  2.1    (1.7-2.2)  mg/dL


 


Total Bilirubin  0.7    (0.2-1.3)  mg/dL


 


AST  44 H    (14-36)  U/L


 


ALT  23    (7-56)  U/L


 


Alkaline Phosphatase  87    ()  U/L


 


Total Protein  7.3    (5.8-8.3)  g/dL


 


Albumin  4.1    (3.0-4.8)  g/dL


 


Globulin  3.3    gm/dL


 


Albumin/Globulin Ratio  1.2    (1.1-1.8)  


 


Blood Type     


 


Blood Type Confirm    B POSITIVE  


 


Antibody Screen     


 


BBK History Checked     














  04/18/19 04/18/19 04/18/19 Range/Units





  20:21 19:30 19:30 


 


WBC   10.8   (4.5-11.0)  10^3/uL


 


RBC   4.61   (3.5-6.1)  10^6/uL


 


Hgb   12.5   (12.0-16.0)  g/dL


 


Hct   38.6   (36.0-48.0)  %


 


MCV   83.7   (80.0-105.0)  fl


 


MCH   27.1   (25.0-35.0)  pg


 


MCHC   32.4   (31.0-37.0)  g/dl


 


RDW   12.9   (11.5-14.5)  %


 


Plt Count   248   (120.0-450.0)  10^3/uL


 


MPV   9.9   (7.0-11.0)  fl


 


Neut % (Auto)   82.1 H   (50.0-68.0)  %


 


Lymph % (Auto)   12.6 L   (22.0-35.0)  %


 


Mono % (Auto)   4.8   (1.0-6.0)  %


 


Eos % (Auto)   0.4 L   (1.5-5.0)  %


 


Baso % (Auto)   0.1   (0.0-3.0)  %


 


Lymph # (Auto)   1.4   (1.2-3.4)  


 


Mono # (Auto)   0.5   (0.1-0.6)  


 


Eos # (Auto)   0.0   (0.0-0.7)  


 


Baso # (Auto)   0.01   (0.0-2.0)  K/mm3


 


Absolute Neuts (auto)   8.83 H   (1.4-6.5)  


 


PT     (9.4-12.5)  SECONDS


 


INR     


 


APTT     (26.9-38.3)  Seconds


 


Sodium    140  (132-148)  mmol/L


 


Potassium    4.4  (3.6-5.0)  mmol/L


 


Chloride    103  ()  mmol/L


 


Carbon Dioxide    27  (21-33)  mmol/L


 


Anion Gap    15  (10-20)  


 


BUN    23 H  (7-21)  mg/dL


 


Creatinine    0.6 L  (0.7-1.2)  mg/dl


 


Est GFR (African Amer)    > 60  


 


Est GFR (Non-Af Amer)    > 60  


 


Random Glucose    97  ()  mg/dL


 


Calcium    9.8  (8.4-10.5)  mg/dL


 


Magnesium     (1.7-2.2)  mg/dL


 


Total Bilirubin    0.8  (0.2-1.3)  mg/dL


 


AST    44 H  (14-36)  U/L


 


ALT    18  (7-56)  U/L


 


Alkaline Phosphatase    85  ()  U/L


 


Total Protein    8.0  (5.8-8.3)  g/dL


 


Albumin    4.4  (3.0-4.8)  g/dL


 


Globulin    3.6  gm/dL


 


Albumin/Globulin Ratio    1.2  (1.1-1.8)  


 


Blood Type  B POSITIVE    


 


Blood Type Confirm     


 


Antibody Screen  Negative    


 


BBK History Checked  No verified bt    














  04/18/19 Range/Units





  19:30 


 


WBC   (4.5-11.0)  10^3/uL


 


RBC   (3.5-6.1)  10^6/uL


 


Hgb   (12.0-16.0)  g/dL


 


Hct   (36.0-48.0)  %


 


MCV   (80.0-105.0)  fl


 


MCH   (25.0-35.0)  pg


 


MCHC   (31.0-37.0)  g/dl


 


RDW   (11.5-14.5)  %


 


Plt Count   (120.0-450.0)  10^3/uL


 


MPV   (7.0-11.0)  fl


 


Neut % (Auto)   (50.0-68.0)  %


 


Lymph % (Auto)   (22.0-35.0)  %


 


Mono % (Auto)   (1.0-6.0)  %


 


Eos % (Auto)   (1.5-5.0)  %


 


Baso % (Auto)   (0.0-3.0)  %


 


Lymph # (Auto)   (1.2-3.4)  


 


Mono # (Auto)   (0.1-0.6)  


 


Eos # (Auto)   (0.0-0.7)  


 


Baso # (Auto)   (0.0-2.0)  K/mm3


 


Absolute Neuts (auto)   (1.4-6.5)  


 


PT  12.0  (9.4-12.5)  SECONDS


 


INR  1.08  


 


APTT  34.5  (26.9-38.3)  Seconds


 


Sodium   (132-148)  mmol/L


 


Potassium   (3.6-5.0)  mmol/L


 


Chloride   ()  mmol/L


 


Carbon Dioxide   (21-33)  mmol/L


 


Anion Gap   (10-20)  


 


BUN   (7-21)  mg/dL


 


Creatinine   (0.7-1.2)  mg/dl


 


Est GFR (African Amer)   


 


Est GFR (Non-Af Amer)   


 


Random Glucose   ()  mg/dL


 


Calcium   (8.4-10.5)  mg/dL


 


Magnesium   (1.7-2.2)  mg/dL


 


Total Bilirubin   (0.2-1.3)  mg/dL


 


AST   (14-36)  U/L


 


ALT   (7-56)  U/L


 


Alkaline Phosphatase   ()  U/L


 


Total Protein   (5.8-8.3)  g/dL


 


Albumin   (3.0-4.8)  g/dL


 


Globulin   gm/dL


 


Albumin/Globulin Ratio   (1.1-1.8)  


 


Blood Type   


 


Blood Type Confirm   


 


Antibody Screen   


 


BBK History Checked   








                         Laboratory Results - last 24 hr











  04/18/19 04/18/19 04/18/19





  19:30 19:30 19:30


 


WBC    10.8


 


RBC    4.61


 


Hgb    12.5


 


Hct    38.6


 


MCV    83.7


 


MCH    27.1


 


MCHC    32.4


 


RDW    12.9


 


Plt Count    248


 


MPV    9.9


 


Neut % (Auto)    82.1 H


 


Lymph % (Auto)    12.6 L


 


Mono % (Auto)    4.8


 


Eos % (Auto)    0.4 L


 


Baso % (Auto)    0.1


 


Lymph # (Auto)    1.4


 


Mono # (Auto)    0.5


 


Eos # (Auto)    0.0


 


Baso # (Auto)    0.01


 


Absolute Neuts (auto)    8.83 H


 


PT  12.0  


 


INR  1.08  


 


APTT  34.5  


 


Sodium   140 


 


Potassium   4.4 


 


Chloride   103 


 


Carbon Dioxide   27 


 


Anion Gap   15 


 


BUN   23 H 


 


Creatinine   0.6 L 


 


Est GFR ( Amer)   > 60 


 


Est GFR (Non-Af Amer)   > 60 


 


Random Glucose   97 


 


Calcium   9.8 


 


Magnesium   


 


Total Bilirubin   0.8 


 


AST   44 H 


 


ALT   18 


 


Alkaline Phosphatase   85 


 


Total Protein   8.0 


 


Albumin   4.4 


 


Globulin   3.6 


 


Albumin/Globulin Ratio   1.2 


 


Blood Type   


 


Blood Type Confirm   


 


Antibody Screen   


 


BBK History Checked   














  04/18/19 04/18/19 04/19/19





  20:21 20:40 05:10


 


WBC    7.1  D


 


RBC    4.34


 


Hgb    11.5 L


 


Hct    36.2


 


MCV    83.4


 


MCH    26.5


 


MCHC    31.8


 


RDW    12.9


 


Plt Count    249


 


MPV    9.6


 


Neut % (Auto)    73.4 H


 


Lymph % (Auto)    20.3 L


 


Mono % (Auto)    5.5


 


Eos % (Auto)    0.7 L


 


Baso % (Auto)    0.1


 


Lymph # (Auto)    1.4


 


Mono # (Auto)    0.4


 


Eos # (Auto)    0.1


 


Baso # (Auto)    0.01


 


Absolute Neuts (auto)    5.19


 


PT   


 


INR   


 


APTT   


 


Sodium   


 


Potassium   


 


Chloride   


 


Carbon Dioxide   


 


Anion Gap   


 


BUN   


 


Creatinine   


 


Est GFR ( Amer)   


 


Est GFR (Non-Af Amer)   


 


Random Glucose   


 


Calcium   


 


Magnesium   


 


Total Bilirubin   


 


AST   


 


ALT   


 


Alkaline Phosphatase   


 


Total Protein   


 


Albumin   


 


Globulin   


 


Albumin/Globulin Ratio   


 


Blood Type  B POSITIVE  


 


Blood Type Confirm   B POSITIVE 


 


Antibody Screen  Negative  


 


BBK History Checked  No verified bt  














  04/19/19





  05:10


 


WBC 


 


RBC 


 


Hgb 


 


Hct 


 


MCV 


 


MCH 


 


MCHC 


 


RDW 


 


Plt Count 


 


MPV 


 


Neut % (Auto) 


 


Lymph % (Auto) 


 


Mono % (Auto) 


 


Eos % (Auto) 


 


Baso % (Auto) 


 


Lymph # (Auto) 


 


Mono # (Auto) 


 


Eos # (Auto) 


 


Baso # (Auto) 


 


Absolute Neuts (auto) 


 


PT 


 


INR 


 


APTT 


 


Sodium  140


 


Potassium  3.6


 


Chloride  101


 


Carbon Dioxide  30


 


Anion Gap  13


 


BUN  16


 


Creatinine  0.6 L


 


Est GFR ( Amer)  > 60


 


Est GFR (Non-Af Amer)  > 60


 


Random Glucose  102


 


Calcium  9.5


 


Magnesium  2.1


 


Total Bilirubin  0.7


 


AST  44 H


 


ALT  23


 


Alkaline Phosphatase  87


 


Total Protein  7.3


 


Albumin  4.1


 


Globulin  3.3


 


Albumin/Globulin Ratio  1.2


 


Blood Type 


 


Blood Type Confirm 


 


Antibody Screen 


 


BBK History Checked 











Radiology Impressions: 


                              Radiology Impressions





Head CT  04/18/19 15:57


IMPRESSION:


Punctate hyperdensity at the level of the anterior falx; small 


subarachnoid hemorrhage must be considered. Recommend 4-6 hours 


follow-up CT in order to assess for stability or resolution. 


 


Deformity of the left nasal bone appears consistent with mildly 


displaced fracture. Associated soft tissue swelling, laceration, 


punctate radiopaque foreign body/debris. 


 


Findings discussed with YUAN Diez on 4/18/19 at 5:47 p.m. 


 


 








Maxillofacial CT  04/18/19 15:57


Impression: 


 


Streak artifact from dental hardware. Deformity of the nasal bones 


consistent with mildly displaced left nasal bone fracture and 


nondisplaced right nasal bone fracture.  Associated soft tissue 


swelling, laceration, punctate radiopaque foreign body/debris. 


 


5 mm anterolisthesis of C4 on C5. Correlate clinically. If indicated, 


recommend further evaluation with dedicated cross-sectional imaging. 


 


Findings discussed with YUAN Diez on 4/18/19 at 6:05 p.m. 


 


 








Cervical Spine CT  04/18/19 18:08


IMPRESSION:


No acute fracture or posttraumatic spondylolisthesis appreciated.  


The cervical curvature is reversed in part due to multilevel 


degenerative spondylolisthesis pattern as discussed above.


 


Multilevel degenerative neural foraminal stenoses appear advanced, 


seen worst at C6-7 where moderate to severe bilateral neural 


foraminal stenoses are identified with less severe multilevel central 


canal stenoses seen worst at C4-5 due to grade 2 spondylolisthesis 


primarily. 


 


Please see discussion above. 


 


 











EKG/Cardiology Studies: 


Cardiology / EKG Studies





04/18/19 19:52


ELECTROCARDIOGRAM Stat 


   Comment: 


   Reason For Exam: FALL














Critical Care Progress Note





- Nutrition


Nutrition: 


                                    Nutrition











 Category Date Time Status


 


 Heart Healthy Diet [DIET] Diets  04/18/19 Dinner Active














Assessment/Plan





- Assessment and Plan (Free Text)


Assessment: 





Pt is a 70 yo female with PMH of HTN, Hypothyroidism, HLD, ulcerative proctitis,

 and cataracts who presents to the ED after a mechanical fall, pt admitted to 

the ICU for evaluation of a possible SAH which was found on CT.


Plan: 





Neuro


- alert and oriented x3


- neuro exam is unremarkable 


- Head CT shows punctate hyperdensity at the level of anterior falx, cannot r/o 

SAH


- Repeat Head CT- unchanged today


- Cervical CT showed no fracture or posttraumatic spondylolisthesis


- Maxillofacial CT showed mildly displaced left nasal bone fracture and 

nondisplaced right nasal bone fracture, possible foreign body


- Neurology Consulted


- Neurosurgery Consulted





Cardio


- HTN, HLD


- amlodipine


- valsartan


- lipitor





Pulm


- maintain O2 >90%





GI


- HHD


- pantoprazole





Nephro/ 


- monitor electrolytes





Heme/Onc


- Hgb 11.5





ID


- cefazolin 





Endo


- hypothyrpodism


- levothyroxine 





Dispo: transfer pt to tele today








Pt seen, examined, assessment and plan discussed with Dr Gurvinder Ramirez PGY1





- Date & Time


Date: 04/19/19


Time: 07:22





<Gurvinder Rosa - Last Filed: 04/19/19 17:28>





CCU Objective





- Vital Signs / Intake & Output


Vital Signs (Last 4 hours): 


Vital Signs











  Pulse Resp BP Pulse Ox


 


 04/19/19 16:00  77  19  119/73 


 


 04/19/19 15:00  80  16  115/72  98


 


 04/19/19 14:28  77  28 H  111/65  98


 


 04/19/19 14:00  89   88/38 L  98














- Medications


Active Medications: 


Active Medications











Generic Name Dose Route Start Last Admin





  Trade Name Hailee  PRN Reason Stop Dose Admin


 


Amlodipine Besylate  5 mg  04/19/19 10:00  04/19/19 10:54





  Norvasc  PO   5 mg





  DAILY ALVIN   Administration





     





     





     





     


 


Atorvastatin Calcium  40 mg  04/19/19 10:00  04/19/19 10:54





  Lipitor  PO   40 mg





  DAILY ALVIN   Administration





     





     





     





     


 


Cefazolin Sodium  1 gm in 100 mls @ 100 mls/hr  04/19/19 06:00  04/19/19 14:44





  Ancef 1gm In Ns  IVPB   100 mls/hr





  Q8 ALVIN   Administration





     





     





  Protocol   





     


 


Levothyroxine Sodium  50 mcg  04/19/19 10:00  04/19/19 10:54





  Synthroid  PO   50 mcg





  DAILY ALVIN   Administration





     





     





     





     


 


Pantoprazole Sodium  40 mg  04/19/19 06:00  04/19/19 06:46





  Protonix Ec Tab  PO   40 mg





  0600 ALVIN   Administration





     





     





     





     


 


Valsartan  320 mg  04/19/19 10:00  04/19/19 10:54





  Diovan  PO   320 mg





  DAILY ALVIN   Administration





     





     





     





     














- Patient Studies


Lab Studies: 


                                   Lab Studies











  04/19/19 04/19/19 04/19/19 Range/Units





  10:30 05:10 05:10 


 


WBC    7.1  D  (4.5-11.0)  10^3/uL


 


RBC    4.34  (3.5-6.1)  10^6/uL


 


Hgb    11.5 L  (12.0-16.0)  g/dL


 


Hct    36.2  (36.0-48.0)  %


 


MCV    83.4  (80.0-105.0)  fl


 


MCH    26.5  (25.0-35.0)  pg


 


MCHC    31.8  (31.0-37.0)  g/dl


 


RDW    12.9  (11.5-14.5)  %


 


Plt Count    249  (120.0-450.0)  10^3/uL


 


MPV    9.6  (7.0-11.0)  fl


 


Neut % (Auto)    73.4 H  (50.0-68.0)  %


 


Lymph % (Auto)    20.3 L  (22.0-35.0)  %


 


Mono % (Auto)    5.5  (1.0-6.0)  %


 


Eos % (Auto)    0.7 L  (1.5-5.0)  %


 


Baso % (Auto)    0.1  (0.0-3.0)  %


 


Lymph # (Auto)    1.4  (1.2-3.4)  


 


Mono # (Auto)    0.4  (0.1-0.6)  


 


Eos # (Auto)    0.1  (0.0-0.7)  


 


Baso # (Auto)    0.01  (0.0-2.0)  K/mm3


 


Absolute Neuts (auto)    5.19  (1.4-6.5)  


 


PT     (9.4-12.5)  SECONDS


 


INR     


 


APTT     (26.9-38.3)  Seconds


 


Sodium   140   (132-148)  mmol/L


 


Potassium   3.6   (3.6-5.0)  mmol/L


 


Chloride   101   ()  mmol/L


 


Carbon Dioxide   30   (21-33)  mmol/L


 


Anion Gap   13   (10-20)  


 


BUN   16   (7-21)  mg/dL


 


Creatinine   0.6 L   (0.7-1.2)  mg/dl


 


Est GFR (African Amer)   > 60   


 


Est GFR (Non-Af Amer)   > 60   


 


Random Glucose   102   ()  mg/dL


 


Calcium   9.5   (8.4-10.5)  mg/dL


 


Magnesium   2.1   (1.7-2.2)  mg/dL


 


Total Bilirubin   0.7   (0.2-1.3)  mg/dL


 


AST   44 H   (14-36)  U/L


 


ALT   23   (7-56)  U/L


 


Alkaline Phosphatase   87   ()  U/L


 


Total Protein   7.3   (5.8-8.3)  g/dL


 


Albumin   4.1   (3.0-4.8)  g/dL


 


Globulin   3.3   gm/dL


 


Albumin/Globulin Ratio   1.2   (1.1-1.8)  


 


25-OH Vitamin D Total  22.8 L    (30.0-100.0)  NG/ML


 


Blood Type     


 


Blood Type Confirm     


 


Antibody Screen     


 


BBK History Checked     














  04/18/19 04/18/19 04/18/19 Range/Units





  20:40 20:21 19:30 


 


WBC    10.8  (4.5-11.0)  10^3/uL


 


RBC    4.61  (3.5-6.1)  10^6/uL


 


Hgb    12.5  (12.0-16.0)  g/dL


 


Hct    38.6  (36.0-48.0)  %


 


MCV    83.7  (80.0-105.0)  fl


 


MCH    27.1  (25.0-35.0)  pg


 


MCHC    32.4  (31.0-37.0)  g/dl


 


RDW    12.9  (11.5-14.5)  %


 


Plt Count    248  (120.0-450.0)  10^3/uL


 


MPV    9.9  (7.0-11.0)  fl


 


Neut % (Auto)    82.1 H  (50.0-68.0)  %


 


Lymph % (Auto)    12.6 L  (22.0-35.0)  %


 


Mono % (Auto)    4.8  (1.0-6.0)  %


 


Eos % (Auto)    0.4 L  (1.5-5.0)  %


 


Baso % (Auto)    0.1  (0.0-3.0)  %


 


Lymph # (Auto)    1.4  (1.2-3.4)  


 


Mono # (Auto)    0.5  (0.1-0.6)  


 


Eos # (Auto)    0.0  (0.0-0.7)  


 


Baso # (Auto)    0.01  (0.0-2.0)  K/mm3


 


Absolute Neuts (auto)    8.83 H  (1.4-6.5)  


 


PT     (9.4-12.5)  SECONDS


 


INR     


 


APTT     (26.9-38.3)  Seconds


 


Sodium     (132-148)  mmol/L


 


Potassium     (3.6-5.0)  mmol/L


 


Chloride     ()  mmol/L


 


Carbon Dioxide     (21-33)  mmol/L


 


Anion Gap     (10-20)  


 


BUN     (7-21)  mg/dL


 


Creatinine     (0.7-1.2)  mg/dl


 


Est GFR (African Amer)     


 


Est GFR (Non-Af Amer)     


 


Random Glucose     ()  mg/dL


 


Calcium     (8.4-10.5)  mg/dL


 


Magnesium     (1.7-2.2)  mg/dL


 


Total Bilirubin     (0.2-1.3)  mg/dL


 


AST     (14-36)  U/L


 


ALT     (7-56)  U/L


 


Alkaline Phosphatase     ()  U/L


 


Total Protein     (5.8-8.3)  g/dL


 


Albumin     (3.0-4.8)  g/dL


 


Globulin     gm/dL


 


Albumin/Globulin Ratio     (1.1-1.8)  


 


25-OH Vitamin D Total     (30.0-100.0)  NG/ML


 


Blood Type   B POSITIVE   


 


Blood Type Confirm  B POSITIVE    


 


Antibody Screen   Negative   


 


BBK History Checked   No verified bt   














  04/18/19 04/18/19 Range/Units





  19:30 19:30 


 


WBC    (4.5-11.0)  10^3/uL


 


RBC    (3.5-6.1)  10^6/uL


 


Hgb    (12.0-16.0)  g/dL


 


Hct    (36.0-48.0)  %


 


MCV    (80.0-105.0)  fl


 


MCH    (25.0-35.0)  pg


 


MCHC    (31.0-37.0)  g/dl


 


RDW    (11.5-14.5)  %


 


Plt Count    (120.0-450.0)  10^3/uL


 


MPV    (7.0-11.0)  fl


 


Neut % (Auto)    (50.0-68.0)  %


 


Lymph % (Auto)    (22.0-35.0)  %


 


Mono % (Auto)    (1.0-6.0)  %


 


Eos % (Auto)    (1.5-5.0)  %


 


Baso % (Auto)    (0.0-3.0)  %


 


Lymph # (Auto)    (1.2-3.4)  


 


Mono # (Auto)    (0.1-0.6)  


 


Eos # (Auto)    (0.0-0.7)  


 


Baso # (Auto)    (0.0-2.0)  K/mm3


 


Absolute Neuts (auto)    (1.4-6.5)  


 


PT   12.0  (9.4-12.5)  SECONDS


 


INR   1.08  


 


APTT   34.5  (26.9-38.3)  Seconds


 


Sodium  140   (132-148)  mmol/L


 


Potassium  4.4   (3.6-5.0)  mmol/L


 


Chloride  103   ()  mmol/L


 


Carbon Dioxide  27   (21-33)  mmol/L


 


Anion Gap  15   (10-20)  


 


BUN  23 H   (7-21)  mg/dL


 


Creatinine  0.6 L   (0.7-1.2)  mg/dl


 


Est GFR (African Amer)  > 60   


 


Est GFR (Non-Af Amer)  > 60   


 


Random Glucose  97   ()  mg/dL


 


Calcium  9.8   (8.4-10.5)  mg/dL


 


Magnesium    (1.7-2.2)  mg/dL


 


Total Bilirubin  0.8   (0.2-1.3)  mg/dL


 


AST  44 H   (14-36)  U/L


 


ALT  18   (7-56)  U/L


 


Alkaline Phosphatase  85   ()  U/L


 


Total Protein  8.0   (5.8-8.3)  g/dL


 


Albumin  4.4   (3.0-4.8)  g/dL


 


Globulin  3.6   gm/dL


 


Albumin/Globulin Ratio  1.2   (1.1-1.8)  


 


25-OH Vitamin D Total    (30.0-100.0)  NG/ML


 


Blood Type    


 


Blood Type Confirm    


 


Antibody Screen    


 


BBK History Checked    








                         Laboratory Results - last 24 hr











  04/18/19 04/18/19 04/18/19





  19:30 19:30 19:30


 


WBC    10.8


 


RBC    4.61


 


Hgb    12.5


 


Hct    38.6


 


MCV    83.7


 


MCH    27.1


 


MCHC    32.4


 


RDW    12.9


 


Plt Count    248


 


MPV    9.9


 


Neut % (Auto)    82.1 H


 


Lymph % (Auto)    12.6 L


 


Mono % (Auto)    4.8


 


Eos % (Auto)    0.4 L


 


Baso % (Auto)    0.1


 


Lymph # (Auto)    1.4


 


Mono # (Auto)    0.5


 


Eos # (Auto)    0.0


 


Baso # (Auto)    0.01


 


Absolute Neuts (auto)    8.83 H


 


PT  12.0  


 


INR  1.08  


 


APTT  34.5  


 


Sodium   140 


 


Potassium   4.4 


 


Chloride   103 


 


Carbon Dioxide   27 


 


Anion Gap   15 


 


BUN   23 H 


 


Creatinine   0.6 L 


 


Est GFR ( Amer)   > 60 


 


Est GFR (Non-Af Amer)   > 60 


 


Random Glucose   97 


 


Calcium   9.8 


 


Magnesium   


 


Total Bilirubin   0.8 


 


AST   44 H 


 


ALT   18 


 


Alkaline Phosphatase   85 


 


Total Protein   8.0 


 


Albumin   4.4 


 


Globulin   3.6 


 


Albumin/Globulin Ratio   1.2 


 


25-OH Vitamin D Total   


 


Blood Type   


 


Blood Type Confirm   


 


Antibody Screen   


 


BBK History Checked   














  04/18/19 04/18/19 04/19/19





  20:21 20:40 05:10


 


WBC    7.1  D


 


RBC    4.34


 


Hgb    11.5 L


 


Hct    36.2


 


MCV    83.4


 


MCH    26.5


 


MCHC    31.8


 


RDW    12.9


 


Plt Count    249


 


MPV    9.6


 


Neut % (Auto)    73.4 H


 


Lymph % (Auto)    20.3 L


 


Mono % (Auto)    5.5


 


Eos % (Auto)    0.7 L


 


Baso % (Auto)    0.1


 


Lymph # (Auto)    1.4


 


Mono # (Auto)    0.4


 


Eos # (Auto)    0.1


 


Baso # (Auto)    0.01


 


Absolute Neuts (auto)    5.19


 


PT   


 


INR   


 


APTT   


 


Sodium   


 


Potassium   


 


Chloride   


 


Carbon Dioxide   


 


Anion Gap   


 


BUN   


 


Creatinine   


 


Est GFR ( Amer)   


 


Est GFR (Non-Af Amer)   


 


Random Glucose   


 


Calcium   


 


Magnesium   


 


Total Bilirubin   


 


AST   


 


ALT   


 


Alkaline Phosphatase   


 


Total Protein   


 


Albumin   


 


Globulin   


 


Albumin/Globulin Ratio   


 


25-OH Vitamin D Total   


 


Blood Type  B POSITIVE  


 


Blood Type Confirm   B POSITIVE 


 


Antibody Screen  Negative  


 


BBK History Checked  No verified bt  














  04/19/19 04/19/19





  05:10 10:30


 


WBC  


 


RBC  


 


Hgb  


 


Hct  


 


MCV  


 


MCH  


 


MCHC  


 


RDW  


 


Plt Count  


 


MPV  


 


Neut % (Auto)  


 


Lymph % (Auto)  


 


Mono % (Auto)  


 


Eos % (Auto)  


 


Baso % (Auto)  


 


Lymph # (Auto)  


 


Mono # (Auto)  


 


Eos # (Auto)  


 


Baso # (Auto)  


 


Absolute Neuts (auto)  


 


PT  


 


INR  


 


APTT  


 


Sodium  140 


 


Potassium  3.6 


 


Chloride  101 


 


Carbon Dioxide  30 


 


Anion Gap  13 


 


BUN  16 


 


Creatinine  0.6 L 


 


Est GFR ( Amer)  > 60 


 


Est GFR (Non-Af Amer)  > 60 


 


Random Glucose  102 


 


Calcium  9.5 


 


Magnesium  2.1 


 


Total Bilirubin  0.7 


 


AST  44 H 


 


ALT  23 


 


Alkaline Phosphatase  87 


 


Total Protein  7.3 


 


Albumin  4.1 


 


Globulin  3.3 


 


Albumin/Globulin Ratio  1.2 


 


25-OH Vitamin D Total   22.8 L


 


Blood Type  


 


Blood Type Confirm  


 


Antibody Screen  


 


BBK History Checked  











Radiology Impressions: 


                              Radiology Impressions





Head CT  04/18/19 15:57


IMPRESSION:


Punctate hyperdensity at the level of the anterior falx; small 


subarachnoid hemorrhage must be considered. Recommend 4-6 hours 


follow-up CT in order to assess for stability or resolution. 


 


Deformity of the left nasal bone appears consistent with mildly 


displaced fracture. Associated soft tissue swelling, laceration, 


punctate radiopaque foreign body/debris. 


 


Findings discussed with YUAN Diez on 4/18/19 at 5:47 p.m. 


 


 








Maxillofacial CT  04/18/19 15:57


Impression: 


 


Streak artifact from dental hardware. Deformity of the nasal bones 


consistent with mildly displaced left nasal bone fracture and 


nondisplaced right nasal bone fracture.  Associated soft tissue 


swelling, laceration, punctate radiopaque foreign body/debris. 


 


5 mm anterolisthesis of C4 on C5. Correlate clinically. If indicated, 


recommend further evaluation with dedicated cross-sectional imaging. 


 


Findings discussed with YUAN Diez on 4/18/19 at 6:05 p.m. 


 


 








Cervical Spine CT  04/18/19 18:08


IMPRESSION:


No acute fracture or posttraumatic spondylolisthesis appreciated.  


The cervical curvature is reversed in part due to multilevel 


degenerative spondylolisthesis pattern as discussed above.


 


Multilevel degenerative neural foraminal stenoses appear advanced, 


seen worst at C6-7 where moderate to severe bilateral neural 


foraminal stenoses are identified with less severe multilevel central 


canal stenoses seen worst at C4-5 due to grade 2 spondylolisthesis 


primarily. 


 


Please see discussion above. 


 


 








Head CT  04/19/19 05:00


IMPRESSION:


No acute findings 


 


 











EKG/Cardiology Studies: 


Cardiology / EKG Studies





04/18/19 19:52


ELECTROCARDIOGRAM Stat 


   Comment: 


   Reason For Exam: FALL














Critical Care Progress Note





- Nutrition


Nutrition: 


                                    Nutrition











 Category Date Time Status


 


 Heart Healthy Diet [DIET] Diets  04/18/19 Dinner Active














Addendum


Addendum: 





04/19/19 17:28


[facility]U Attending Addendum


Patient seen and examined with housestaff


case discussed on round


agree with resident note above with the following additions/exceptions:





69F with f HTN, Hypothyroidism, HLD, s/p  mechanical fall found to have punctate

 hyperdensity at the level of the anterior falx and small subarachnoid 

hemorrhage  on CT.  No neuro deifcits


F/u neuro recs


HOB > 30


bp control 120-140


pt ot speech swallow


repeat ct head


transfer to tele later today if no changes





Rest of care as mentioned in above resident note





Gurvinder Rosa MD


Pulmonary Critical Care Sleep Medicine

## 2019-04-19 NOTE — CP.PCM.PN
Subjective





- Date & Time of Evaluation


Date of Evaluation: 04/19/19


Time of Evaluation: 09:47





- Subjective


Subjective: 





suggested to PA yesterday that CT was over-read and she should have repeat later

that day.


CT repeat normal


no indication for continued admission based on neruo status





Objective





- Vital Signs/Intake and Output


Vital Signs (last 24 hours): 


                                        











Temp Pulse Resp BP Pulse Ox


 


 98.3 F   74   20   115/73   99 


 


 04/19/19 04:00  04/19/19 04:00  04/19/19 04:00  04/19/19 04:00  04/19/19 04:00











- Medications


Medications: 


                               Current Medications





Amlodipine Besylate (Norvasc)  5 mg PO DAILY [facility]


Atorvastatin Calcium (Lipitor)  40 mg PO DAILY [facility]


Cefazolin Sodium (Ancef 1gm In Ns)  1 gm in 100 mls @ 100 mls/hr IVPB [facility]; 

Protocol


   Last Admin: 04/19/19 06:45 Dose:  100 mls/hr


Levothyroxine Sodium (Synthroid)  50 mcg PO DAILY [facility]


Pantoprazole Sodium (Protonix Ec Tab)  40 mg PO 0600 [facility]


   Last Admin: 04/19/19 06:46 Dose:  40 mg


Valsartan (Diovan)  320 mg PO DAILY [facility]











- Labs


Labs: 


                                        





                                 04/19/19 05:10 





                                 04/19/19 05:10 





                                        











PT  12.0 SECONDS (9.4-12.5)   04/18/19  19:30    


 


INR  1.08   04/18/19  19:30    


 


APTT  34.5 Seconds (26.9-38.3)   04/18/19  19:30

## 2019-04-19 NOTE — CT
Date of service: 



04/19/2019



PROCEDURE:  CT HEAD WITHOUT CONTRAST.



HISTORY:

r/o SAH



COMPARISON:

04/18/2019



TECHNIQUE:

Axial computed tomography images were obtained through the head/brain 

without intravenous contrast.  



Radiation dose:



Total exam DLP = 847.92 mGy-cm.



This CT exam was performed using one or more of the following dose 

reduction techniques: Automated exposure control, adjustment of the 

mA and/or kV according to patient size, and/or use of iterative 

reconstruction technique.



FINDINGS:



HEMORRHAGE:

No intracranial hemorrhage. 



BRAIN:

No mass effect or edema.  No atrophy or chronic microvascular 

ischemic changes.



VENTRICLES:

Unremarkable. No hydrocephalus. 



CALVARIUM:

Unremarkable.



PARANASAL SINUSES:

Unremarkable as visualized. No significant inflammatory changes.



MASTOID AIR CELLS:

Unremarkable as visualized. No inflammatory changes.



OTHER FINDINGS:

The report concurs with the preliminary [facility] report



IMPRESSION:

No acute findings

## 2019-04-19 NOTE — CP.PCM.CON
History of Present Illness





- History of Present Illness


History of Present Illness: 





68 y/o female with PMHx significant for HTN, Hyperlipidemia, hypothyroidism, 

ulcerative proctitis had fall and [place] [facility] [place]  [place] states she was 

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  ENT was asked to 

see the patient regarding nasal bone fracture and laceration to nose.  Pt states

she can breathe well through the nose.  Denies excessive pain stitched in ED.  





CT reviewed and discussed with patient, maxillofacial





Review of Systems





- Constitutional


Constitutional: As Per HPI





- EENT


Eyes: As Per HPI


Ears: As Per HPI


Nose/Mouth/Throat: As Per HPI, Nasal Congestion, Nasal Obstruction, Nasal 

Trauma, Other





- Breasts


Breasts: As Per HPI





- Cardiovascular


Cardiovascular: As Per HPI





- Respiratory


Respiratory: As Per HPI





- Gastrointestinal


Gastrointestinal: As Per HPI





- Genitourinary


Genitourinary: As Per HPI





- Menstruation


Menstruation: As Per HPI





- Musculoskeletal


Musculoskeletal: As Per HPI





- Integumentary


Integumentary: As Per HPI





- Neurological


Neurological: As Per HPI





- Psychiatric


Psychiatric: As Per HPI





- Endocrine


Endocrine: As Per HPI





- Hematologic/Lymphatic


Hematologic: As Per HPI





Past Patient History





- Infectious Disease


Hx of Infectious Diseases: None





- Tetanus Immunizations


Tetanus Immunization: Up to Date





- Past Social History


Smoking Status: Never Smoked





- CARDIAC


Hx Hypertension: Yes





- PULMONARY


Hx Asthma: Yes





- HEENT


Hx Cataracts: Yes





- ENDOCRINE/METABOLIC


Hx Hypothyroidism: Yes





- HEMATOLOGICAL/ONCOLOGICAL


Other/Comment: Skin Ca - nose





- INTEGUMENTARY


Other/Comment: Skin CA





- MUSCULOSKELETAL/RHEUMATOLOGICAL


Hx Falls: No





- GASTROINTESTINAL


Other/Comment: ulcerative proctitis





- PSYCHIATRIC


Hx Substance Use: No





- SURGICAL HISTORY


Hx Hysterectomy: Yes





Meds


Allergies/Adverse Reactions: 


                                    Allergies











Allergy/AdvReac Type Severity Reaction Status Date / Time


 


Citrus And Derivatives Allergy  RASH Verified 09/21/18 07:52


 


grass pollen Allergy  RASH Verified 09/21/18 07:52


 


nitrofurantoin Allergy  RASH Verified 04/18/19 20:41





[From Macrobid]     


 


wool Allergy  RASH Verified 09/21/18 07:52














- Medications


Medications: 


                               Current Medications





Amlodipine Besylate (Norvasc)  5 mg PO DAILY [facility]


   Last Admin: 04/19/19 10:54 Dose:  5 mg


Atorvastatin Calcium (Lipitor)  40 mg PO DAILY [facility]


   Last Admin: 04/19/19 10:54 Dose:  40 mg


Cefazolin Sodium (Ancef 1gm In Ns)  1 gm in 100 mls @ 100 mls/hr IVPB Q8 [facility]; 

Protocol


   Last Admin: 04/19/19 06:45 Dose:  100 mls/hr


Levothyroxine Sodium (Synthroid)  50 mcg PO DAILY [facility]


   Last Admin: 04/19/19 10:54 Dose:  50 mcg


Pantoprazole Sodium (Protonix Ec Tab)  40 mg PO 0600 [facility]


   Last Admin: 04/19/19 06:46 Dose:  40 mg


Valsartan (Diovan)  320 mg PO DAILY [facility]


   Last Admin: 04/19/19 10:54 Dose:  320 mg











Physical Exam





- Constitutional


Appears: Well





- Head Exam


Head Exam: NORMAL INSPECTION





- Eye Exam


Eye Exam: EOMI, Normal appearance


Pupil Exam: NORMAL ACCOMODATION





- ENT Exam


ENT Exam: Mucous Membranes Moist


Additional comments: 


septum midline without hematoma.  Mild nasal bone displacement with dorsum 

lacertion.  








- Neck Exam


Neck exam: Positive for: Normal Inspection





- Respiratory Exam


Respiratory Exam: NORMAL BREATHING PATTERN





- Extremities Exam


Extremities exam: Positive for: normal inspection





- Neurological Exam


Neurological exam: Oriented x3





- Psychiatric Exam


Psychiatric exam: Normal Affect, Normal Mood





Results





- Vital Signs


Recent Vital Signs: 


                                Last Vital Signs











Temp  98.3 F   04/19/19 04:00


 


Pulse  82   04/19/19 10:54


 


Resp  20   04/19/19 04:00


 


BP  119/67   04/19/19 10:54


 


Pulse Ox  99   04/19/19 04:00














- Labs


Result Diagrams: 


                                 04/19/19 05:10





                                 04/19/19 05:10


Labs: 


                         Laboratory Results - last 24 hr











  04/18/19 04/18/19 04/18/19





  19:30 19:30 19:30


 


WBC    10.8


 


RBC    4.61


 


Hgb    12.5


 


Hct    38.6


 


MCV    83.7


 


MCH    27.1


 


MCHC    32.4


 


RDW    12.9


 


Plt Count    248


 


MPV    9.9


 


Neut % (Auto)    82.1 H


 


Lymph % (Auto)    12.6 L


 


Mono % (Auto)    4.8


 


Eos % (Auto)    0.4 L


 


Baso % (Auto)    0.1


 


Lymph # (Auto)    1.4


 


Mono # (Auto)    0.5


 


Eos # (Auto)    0.0


 


Baso # (Auto)    0.01


 


Absolute Neuts (auto)    8.83 H


 


PT  12.0  


 


INR  1.08  


 


APTT  34.5  


 


Sodium   140 


 


Potassium   4.4 


 


Chloride   103 


 


Carbon Dioxide   27 


 


Anion Gap   15 


 


BUN   23 H 


 


Creatinine   0.6 L 


 


Est GFR ( Amer)   > 60 


 


Est GFR (Non-Af Amer)   > 60 


 


Random Glucose   97 


 


Calcium   9.8 


 


Magnesium   


 


Total Bilirubin   0.8 


 


AST   44 H 


 


ALT   18 


 


Alkaline Phosphatase   85 


 


Total Protein   8.0 


 


Albumin   4.4 


 


Globulin   3.6 


 


Albumin/Globulin Ratio   1.2 


 


Blood Type   


 


Blood Type Confirm   


 


Antibody Screen   


 


BBK History Checked   














  04/18/19 04/18/19 04/19/19





  20:21 20:40 05:10


 


WBC    7.1  D


 


RBC    4.34


 


Hgb    11.5 L


 


Hct    36.2


 


MCV    83.4


 


MCH    26.5


 


MCHC    31.8


 


RDW    12.9


 


Plt Count    249


 


MPV    9.6


 


Neut % (Auto)    73.4 H


 


Lymph % (Auto)    20.3 L


 


Mono % (Auto)    5.5


 


Eos % (Auto)    0.7 L


 


Baso % (Auto)    0.1


 


Lymph # (Auto)    1.4


 


Mono # (Auto)    0.4


 


Eos # (Auto)    0.1


 


Baso # (Auto)    0.01


 


Absolute Neuts (auto)    5.19


 


PT   


 


INR   


 


APTT   


 


Sodium   


 


Potassium   


 


Chloride   


 


Carbon Dioxide   


 


Anion Gap   


 


BUN   


 


Creatinine   


 


Est GFR ( Amer)   


 


Est GFR (Non-Af Amer)   


 


Random Glucose   


 


Calcium   


 


Magnesium   


 


Total Bilirubin   


 


AST   


 


ALT   


 


Alkaline Phosphatase   


 


Total Protein   


 


Albumin   


 


Globulin   


 


Albumin/Globulin Ratio   


 


Blood Type  B POSITIVE  


 


Blood Type Confirm   B POSITIVE 


 


Antibody Screen  Negative  


 


BBK History Checked  No verified bt  














  04/19/19





  05:10


 


WBC 


 


RBC 


 


Hgb 


 


Hct 


 


MCV 


 


MCH 


 


MCHC 


 


RDW 


 


Plt Count 


 


MPV 


 


Neut % (Auto) 


 


Lymph % (Auto) 


 


Mono % (Auto) 


 


Eos % (Auto) 


 


Baso % (Auto) 


 


Lymph # (Auto) 


 


Mono # (Auto) 


 


Eos # (Auto) 


 


Baso # (Auto) 


 


Absolute Neuts (auto) 


 


PT 


 


INR 


 


APTT 


 


Sodium  140


 


Potassium  3.6


 


Chloride  101


 


Carbon Dioxide  30


 


Anion Gap  13


 


BUN  16


 


Creatinine  0.6 L


 


Est GFR ( Amer)  > 60


 


Est GFR (Non-Af Amer)  > 60


 


Random Glucose  102


 


Calcium  9.5


 


Magnesium  2.1


 


Total Bilirubin  0.7


 


AST  44 H


 


ALT  23


 


Alkaline Phosphatase  87


 


Total Protein  7.3


 


Albumin  4.1


 


Globulin  3.3


 


Albumin/Globulin Ratio  1.2


 


Blood Type 


 


Blood Type Confirm 


 


Antibody Screen 


 


BBK History Checked 














- Impressions


Impression: 





CT maxillofacial reviewed





Assessment & Plan


(1) Laceration of nose


Status: Acute   





(2) Nasal bone fracture


Status: Acute   





- Assessment and Plan (Free Text)


Assessment: 





nasal bone fracture


Plan: 





saline f/u in office for suture removal.  





- Date & Time


Date: 04/19/19


Time: 14:52

## 2019-04-19 NOTE — CP.PCM.PN
Subjective





- Date & Time of Evaluation


Date of Evaluation: 04/19/19


Time of Evaluation: 07:11





Objective





- Vital Signs/Intake and Output


Vital Signs (last 24 hours): 


                                        











Temp Pulse Resp BP Pulse Ox


 


 98.3 F   74   20   115/73   99 


 


 04/19/19 04:00  04/19/19 04:00  04/19/19 04:00  04/19/19 04:00  04/19/19 04:00











- Medications


Medications: 


                               Current Medications





Amlodipine Besylate (Norvasc)  [facility] mg [facility]ILY [facility]


Atorvastatin Calcium (Lipitor)  40 mg PO DAILY [facility]


Cefazolin Sodium (Ancef 1gm In Ns)  1 gm in 100 mls @ 100 mls/hr IVPB Q8 [facility]; 

Protocol


   Last Admin: 04/19/19 06:45 Dose:  100 mls/hr


Levothyroxine Sodium (Synthroid)  50 mcg PO DAILY [facility]


Pantoprazole Sodium (Protonix Ec Tab)  40 mg PO 0600 [facility]


   Last Admin: 04/19/19 06:46 Dose:  40 mg


Valsartan (Diovan)  320 mg PO DAILY [facility]











- Labs


Labs: 


                                        





                                 04/19/19 05:10 





                                 04/19/19 05:10 





                                        











PT  12.0 SECONDS (9.4-12.5)   04/18/19  19:30    


 


INR  1.08   04/18/19  19:30    


 


APTT  34.5 Seconds (26.9-38.3)   04/18/19  19:30

## 2019-04-19 NOTE — CP.PCM.DIS
<Constantine Donahue - Last Filed: 04/19/19 20:20>





Provider





- Provider


Date of Admission: 


04/18/19 19:34





Attending physician: 


Nicol Bell MD





Primary care physician: 


Romeo Suarez MD





Consults: 








04/18/19 20:26


Neurology Consult Routine 


   Comment: 


   Consulting Provider: Albert Wells


   Consulting Physician: Albert Wells


   Reason for Consult: possible SAH


Physician Consult Routine 


   Comment: 


   Consulting Provider: Norris Spring


   Consulting Physician: Norris Spring


   Reason for Consult: possible SAH





04/19/19 09:52


ENT [Otolaryngology Consult] Routine 


   Consulting Provider: Garcia Liu


   Consulting Physician: Garcia Liu


   Reason for Consult: nondisplaced nasal fracture











Time Spent in preparation of Discharge (in minutes): 45





Diagnosis





- Discharge Diagnosis


(1) Laceration of nose


Status: Acute   





(2) Nasal bone fracture


Status: Acute   





(3) Subarachnoid hemorrhage


Status: Resolved   





(4) HTN (hypertension)


Status: Chronic   





(5) Hyperlipemia


Status: Chronic   





Hospital Course





- Lab Results


Lab Results: 


                             Most Recent Lab Values











WBC  7.1 10^3/uL (4.5-11.0)  D 04/19/19  05:10    


 


RBC  4.34 10^6/uL (3.5-6.1)   04/19/19  05:10    


 


Hgb  11.5 g/dL (12.0-16.0)  L  04/19/19  05:10    


 


Hct  36.2 % (36.0-48.0)   04/19/19  05:10    


 


MCV  83.4 fl (80.0-105.0)   04/19/19  05:10    


 


MCH  26.5 pg (25.0-35.0)   04/19/19  05:10    


 


MCHC  31.8 g/dl (31.0-37.0)   04/19/19  05:10    


 


RDW  12.9 % (11.5-14.5)   04/19/19  05:10    


 


Plt Count  249 10^3/uL (120.0-450.0)   04/19/19  05:10    


 


MPV  9.6 fl (7.0-11.0)   04/19/19  05:10    


 


Neut % (Auto)  73.4 % (50.0-68.0)  H  04/19/19  05:10    


 


Lymph % (Auto)  20.3 % (22.0-35.0)  L  04/19/19  05:10    


 


Mono % (Auto)  5.5 % (1.0-6.0)   04/19/19  05:10    


 


Eos % (Auto)  0.7 % (1.5-5.0)  L  04/19/19  05:10    


 


Baso % (Auto)  0.1 % (0.0-3.0)   04/19/19  05:10    


 


Lymph # (Auto)  1.4  (1.2-3.4)   04/19/19  05:10    


 


Mono # (Auto)  0.4  (0.1-0.6)   04/19/19  05:10    


 


Eos # (Auto)  0.1  (0.0-0.7)   04/19/19  05:10    


 


Baso # (Auto)  0.01 K/mm3 (0.0-2.0)   04/19/19  05:10    


 


Absolute Neuts (auto)  5.19  (1.4-6.5)   04/19/19  05:10    


 


PT  12.0 SECONDS (9.4-12.5)   04/18/19  19:30    


 


INR  1.08   04/18/19  19:30    


 


APTT  34.5 Seconds (26.9-38.3)   04/18/19  19:30    


 


Sodium  140 mmol/L (132-148)   04/19/19  05:10    


 


Potassium  3.6 mmol/L (3.6-5.0)   04/19/19  05:10    


 


Chloride  101 mmol/L ()   04/19/19  05:10    


 


Carbon Dioxide  30 mmol/L (21-33)   04/19/19  05:10    


 


Anion Gap  13  (10-20)   04/19/19  05:10    


 


BUN  16 mg/dL (7-21)   04/19/19  05:10    


 


Creatinine  0.6 mg/dl (0.7-1.2)  L  04/19/19  05:10    


 


Est GFR ( Amer)  > 60   04/19/19  05:10    


 


Est GFR (Non-Af Amer)  > 60   04/19/19  05:10    


 


Random Glucose  102 mg/dL ()   04/19/19  05:10    


 


Calcium  9.5 mg/dL (8.4-10.5)   04/19/19  05:10    


 


Magnesium  2.1 mg/dL (1.7-2.2)   04/19/19  05:10    


 


Total Bilirubin  0.7 mg/dL (0.2-1.3)   04/19/19  05:10    


 


AST  44 U/L (14-36)  H  04/19/19  05:10    


 


ALT  23 U/L (7-56)   04/19/19  05:10    


 


Alkaline Phosphatase  87 U/L ()   04/19/19  05:10    


 


Total Protein  7.3 g/dL (5.8-8.3)   04/19/19  05:10    


 


Albumin  4.1 g/dL (3.0-4.8)   04/19/19  05:10    


 


Globulin  3.3 gm/dL  04/19/19  05:10    


 


Albumin/Globulin Ratio  1.2  (1.1-1.8)   04/19/19  05:10    


 


Blood Type  B POSITIVE   04/18/19  20:21    


 


Blood Type Confirm  B POSITIVE   04/18/19  20:40    


 


Antibody Screen  Negative   04/18/19  20:21    


 


BBK History Checked  No verified bt   04/18/19  20:21    














- Hospital Course


Hospital Course: 


Patient is a 68 yo F with PMH of HTN, Hypothyroidism, HLD, ulcerative proctitis,

and cataracts presents to [facility] [place] [facility] after mechanical fall at home. Patient staets that

she was rushing to get to her piano lession and tripped. Patient fell and hit 

her face on the ground. Patient denies any LOC, dizziness, confusion, change in 

vision/hearing, neck/back pain, or weakness after the fall. Patient sustained a 

laceration to her nose. Nasal laceration was irrigated, cleaned, and sutured in 

ED without evidence of foreign body. Patient was admitted to the ICU for 

observation and continued treatment. Head CT showed punctate hyperdensity at the

level of anterior falx, cannot r/o SAH. Maxillofacial CT showed mildly displaced

left nasal bone fracture and nondisplaced right nasal bone fracture, possible 

foreign body. Cervical CT showed no fracture or posttraumatic spondylolisthesis,

multilevel degenerative neural foraminal stenoses, grade 2 spondylolisthesis C4-

5. ENT, Dr. Liu saw the patient and instructed patient to follow up in his 

in 1 week to remove suture. Neurosurgery, Dr. Spring cleared her for 

discharge. Ancef was started for foreign body prophylaxis. Patient was 

downgraded from the ICU. Patient was sent home with 5 days of Keflex that was 

sent to her pharmacy. Patient instructed to follow up with her PMD and ENT. 

Patient medically stable for discharge. 








Discharge Exam





- Additional Findings


Additional findings: 


- Constitutional


Appears: No Acute Distress





- Head Exam


Head Exam: absent: ATRAUMATIC (Abrasion upper lip, inferior to left nostril; 

abraion to left side of forehead)





- Eye Exam


Eye Exam: EOMI, Normal appearance, PERRL





- ENT Exam


ENT Exam: Mucous Membranes Moist


Additional comments: 





2.5 cm Laceration to bridge of nose with edema





- Neck Exam


Neck exam: Positive for: Normal Inspection





- Respiratory Exam


Respiratory Exam: Clear to Auscultation Bilateral.  absent: Rales, Rhonchi, 

Wheezes





- Cardiovascular Exam


Cardiovascular Exam: RRR, +S1, +S2.  absent: Diastolic murmur, Gallop, Rubs, 

Systolic Murmur





- GI/Abdominal Exam


GI & Abdominal Exam: Normal Bowel Sounds, Soft.  absent: Distended, Guarding, 

Rebound, Tenderness





- Extremities Exam


Extremities exam: Positive for: normal inspection





- Back Exam


Back exam: NORMAL INSPECTION





- Neurological Exam


Neurological exam: Alert, CN II-XII Intact, Oriented x3





- Psychiatric Exam


Psychiatric exam: Normal Affect, Normal Mood





- Skin


Skin Exam: Normal Color, Warm








Discharge Plan





- Discharge Medications


Prescriptions: 


Cephalexin [cephalexin] 500 mg PO BID #10 cap





- Follow Up Plan


Condition: CRITICAL


Disposition: HOME/ ROUTINE


Additional Instructions: 


- Take Keflex 500mg twice daily for 5 more days


- Resume all your home medications as prescribed by your doctor.


- Follow up with your primary care doctor in 3-5 days of discharge.


- Follow up with ENT, Dr. Liu in his office in 7 days for suture removal.


- Return to the Emergency room for newly concerning or worsening of symptoms.


Referrals: 


Romeo Suarez MD [Primary Care Provider] - Follow up with primary


Garcia Liu DO [Staff Provider] - 





<Nicol Bell - Last Filed: 04/20/19 11:04>





Provider





- Provider


Date of Admission: 


04/18/19 19:34





Attending physician: 


Nicol Bell MD





Primary care physician: 


Romeo Suarez MD





Consults: 








04/18/19 20:26


Neurology Consult Routine 


   Comment: 


   Consulting Provider: Albert Wells


   Consulting Physician: Albert Wells


   Reason for Consult: possible SAH


Physician Consult Routine 


   Comment: 


   Consulting Provider: Norris Spring


   Consulting Physician: Norris Spring


   Reason for Consult: possible SAH





04/19/19 09:52


ENT [Otolaryngology Consult] Routine 


   Consulting Provider: Garcia Liu


   Consulting Physician: Garcia Liu


   Reason for Consult: nondisplaced nasal fracture














Hospital Course





- Lab Results


Lab Results: 


                                  Micro Results





04/18/19 23:28   Nose   MRSA Culture (Admit) - Final


                            MRSA NOT DETECTED





                             Most Recent Lab Values











WBC  7.1 10^3/uL (4.5-11.0)  D 04/19/19  05:10    


 


RBC  4.34 10^6/uL (3.5-6.1)   04/19/19  05:10    


 


Hgb  11.5 g/dL (12.0-16.0)  L  04/19/19  05:10    


 


Hct  36.2 % (36.0-48.0)   04/19/19  05:10    


 


MCV  83.4 fl (80.0-105.0)   04/19/19  05:10    


 


MCH  26.5 pg (25.0-35.0)   04/19/19  05:10    


 


MCHC  31.8 g/dl (31.0-37.0)   04/19/19  05:10    


 


RDW  12.9 % (11.5-14.5)   04/19/19  05:10    


 


Plt Count  249 10^3/uL (120.0-450.0)   04/19/19  05:10    


 


MPV  9.6 fl (7.0-11.0)   04/19/19  05:10    


 


Neut % (Auto)  73.4 % (50.0-68.0)  H  04/19/19  05:10    


 


Lymph % (Auto)  20.3 % (22.0-35.0)  L  04/19/19  05:10    


 


Mono % (Auto)  5.5 % (1.0-6.0)   04/19/19  05:10    


 


Eos % (Auto)  0.7 % (1.5-5.0)  L  04/19/19  05:10    


 


Baso % (Auto)  0.1 % (0.0-3.0)   04/19/19  05:10    


 


Lymph # (Auto)  1.4  (1.2-3.4)   04/19/19  05:10    


 


Mono # (Auto)  0.4  (0.1-0.6)   04/19/19  05:10    


 


Eos # (Auto)  0.1  (0.0-0.7)   04/19/19  05:10    


 


Baso # (Auto)  0.01 K/mm3 (0.0-2.0)   04/19/19  05:10    


 


Absolute Neuts (auto)  5.19  (1.4-6.5)   04/19/19  05:10    


 


PT  12.0 SECONDS (9.4-12.5)   04/18/19  19:30    


 


INR  1.08   04/18/19  19:30    


 


APTT  34.5 Seconds (26.9-38.3)   04/18/19  19:30    


 


Sodium  140 mmol/L (132-148)   04/19/19  05:10    


 


Potassium  3.6 mmol/L (3.6-5.0)   04/19/19  05:10    


 


Chloride  101 mmol/L ()   04/19/19  05:10    


 


Carbon Dioxide  30 mmol/L (21-33)   04/19/19  05:10    


 


Anion Gap  13  (10-20)   04/19/19  05:10    


 


BUN  16 mg/dL (7-21)   04/19/19  05:10    


 


Creatinine  0.6 mg/dl (0.7-1.2)  L  04/19/19  05:10    


 


Est GFR ( Amer)  > 60   04/19/19  05:10    


 


Est GFR (Non-Af Amer)  > 60   04/19/19  05:10    


 


Random Glucose  102 mg/dL ()   04/19/19  05:10    


 


Calcium  9.5 mg/dL (8.4-10.5)   04/19/19  05:10    


 


Magnesium  2.1 mg/dL (1.7-2.2)   04/19/19  05:10    


 


Total Bilirubin  0.7 mg/dL (0.2-1.3)   04/19/19  05:10    


 


AST  44 U/L (14-36)  H  04/19/19  05:10    


 


ALT  23 U/L (7-56)   04/19/19  05:10    


 


Alkaline Phosphatase  87 U/L ()   04/19/19  05:10    


 


Total Protein  7.3 g/dL (5.8-8.3)   04/19/19  05:10    


 


Albumin  4.1 g/dL (3.0-4.8)   04/19/19  05:10    


 


Globulin  3.3 gm/dL  04/19/19  05:10    


 


Albumin/Globulin Ratio  1.2  (1.1-1.8)   04/19/19  05:10    


 


25-OH Vitamin D Total  22.8 NG/ML (30.0-100.0)  L  04/19/19  10:30    


 


Blood Type  B POSITIVE   04/18/19  20:21    


 


Blood Type Confirm  B POSITIVE   04/18/19  20:40    


 


Antibody Screen  Negative   04/18/19  20:21    


 


BBK History Checked  No verified bt   04/18/19  20:21    














Attending/Attestation





- Attestation


I have personally seen and examined this patient.: Yes


I have fully participated in the care of the patient.: Yes


I have reviewed all pertinent clinical information, including history, physical 

exam and plan: Yes


Notes (Text): 





04/20/19 11:00


Patient was seen and examined with medical resident.





69 years old  Female  with PMH of HTN, Hypothyroidism, HLD, ulcerative 

proctitis, and cataracts presents to [facility] [place] after mechanical fall at home.


Patient sustained a laceration to her nose. Nasal laceration was irrigated, 

cleaned, and sutured in ED without evidence of foreign body. Patient was 

admitted to the ICU for observation and continued treatment as initial  head CT 

showed punctate hyperdensity at the level of anterior falx, cannot r/o SAH. 

Repeat CT head was negative for any bleeding.Patient was also evaluated by Neuro

surgery and felt that there is no evidence of hemorrhage.Patient Neurological 

examination was benign and non focal and remain stable during 

hospitalization.This was discussed in detail with patient.





Maxillofacial CT showed mildly displaced left nasal bone fracture and 

nondisplaced right nasal bone fracture, possible foreign body. Cervical CT 

showed no fracture or posttraumatic spondylolisthesis, multilevel degenerative 

neural foraminal stenoses, grade 2 spondylolisthesis C4-5. 


ENT, Dr. Liu saw the patient and instructed patient to follow up in his in 1

week to remove suture. 





Patient will be discharged home and will follow up with PCP and ENT.





Management plan was discussed in detail with patient. Education was provided.

## 2019-04-20 NOTE — CP.PCM.CON
<DakshaJf - Last Filed: 04/20/19 03:59>





History of Present Illness





- History of Present Illness


History of Present Illness: 


Neuro consult (Dr. Rajinder Crooks, PGY - 2





Reason for consult:   R/o SAH





Requested by:   Dr. Barry





69 F with pertinent medical history of HTN, HLD, Cataracts, and Hypothyroidism 

presents s/p mechanical fall in which she injured many bones of the face, and 

suffered several peripheral bruises.  Patient vehemently denies any dizziness, 

aura, palpitations, chest pain, tongue biting, loss of bowel or bladder, or any 

precipitating event that led her to fall.  Patient currently denies any loss of 

sensation, loss of motor ability, confusion, or distress.





Review of Systems:   12 point ROS obtained and negative except as per HPI


PMH:       HTN, Hypothyroidism, HLD, ulcerative proctitis, cataracts


Surg:       Cataract surgery, hysterectomy, skin CA removal from nose


All:       Macrobid


SH:       Denied EtOH, tobacco, and illicit drug use


FHx:       Ulcerative colitis and Colon CA


Medications:       Reviewed; as per MAR


PMD:       Dedousis





Past Patient History





- Infectious Disease


Hx of Infectious Diseases: None





- Tetanus Immunizations


Tetanus Immunization: Up to Date





- Past Social History


Smoking Status: Never Smoked





- CARDIAC


Hx Hypertension: Yes





- PULMONARY


Hx Asthma: Yes





- HEENT


Hx Cataracts: Yes





- ENDOCRINE/METABOLIC


Hx Hypothyroidism: Yes





- HEMATOLOGICAL/ONCOLOGICAL


Other/Comment: Skin Ca - nose





- INTEGUMENTARY


Other/Comment: Skin CA





- MUSCULOSKELETAL/RHEUMATOLOGICAL


Hx Falls: No





- GASTROINTESTINAL


Other/Comment: ulcerative proctitis





- PSYCHIATRIC


Hx Substance Use: No





- SURGICAL HISTORY


Hx Hysterectomy: Yes





Meds


Home Medications: 


                              Home Medication List











 Medication  Instructions  Recorded  Confirmed  Type


 


Cephalexin [cephalexin] 500 mg PO BID #10 cap 04/19/19  Rx











Allergies/Adverse Reactions: 


                                    Allergies











Allergy/AdvReac Type Severity Reaction Status Date / Time


 


Citrus And Derivatives Allergy  RASH Verified 09/21/18 07:52


 


grass pollen Allergy  RASH Verified 09/21/18 07:52


 


nitrofurantoin Allergy  RASH Verified 04/18/19 20:41





[From Macrobid]     


 


wool Allergy  RASH Verified 09/21/18 07:52














Physical Exam





- Constitutional


Appears: Well





- Head Exam


Head Exam: NORMAL INSPECTION, NORMOCEPHALIC.  absent: ATRAUMATIC





- Eye Exam


Eye Exam: EOMI, Normal appearance, PERRL


Pupil Exam: NORMAL ACCOMODATION, PERRL





- ENT Exam


ENT Exam: Mucous Membranes Moist, Normal Exam





- Neck Exam


Neck exam: Positive for: Normal Inspection





- Respiratory Exam


Respiratory Exam: Clear to Auscultation Bilateral, NORMAL BREATHING PATTERN





- Cardiovascular Exam


Cardiovascular Exam: REGULAR RHYTHM





- GI/Abdominal Exam


GI & Abdominal Exam: Normal Bowel Sounds, Soft.  absent: Tenderness





- Extremities Exam


Extremities exam: Negative for: normal inspection (several bruises in bilateral 

LE)





- Back Exam


Back exam: NORMAL INSPECTION





- Neurological Exam


Neurological exam: Alert, CN II-XII Intact, Normal Gait, Oriented x3, Reflexes 

Normal





- Psychiatric Exam


Psychiatric exam: Normal Affect, Normal Mood





- Skin


Skin Exam: Dry, Intact, Normal Color, Warm





- Additional Findings


Additional findings: 





Patient has dried blood around nose and eyes; multiple contusions symmetrically 

in b/l LE.  





Results





- Vital Signs


Recent Vital Signs: 


                                Last Vital Signs











Temp  98.6 F   04/19/19 07:00


 


Pulse  77   04/19/19 16:00


 


Resp  19   04/19/19 16:00


 


BP  119/73   04/19/19 16:00


 


Pulse Ox  98   04/19/19 15:00














- Labs


Result Diagrams: 


                                 04/19/19 05:10





                                 04/19/19 05:10


Labs: 


                         Laboratory Results - last 24 hr











  04/19/19 04/19/19 04/19/19





  05:10 05:10 10:30


 


WBC  7.1  D  


 


RBC  4.34  


 


Hgb  11.5 L  


 


Hct  36.2  


 


MCV  83.4  


 


MCH  26.5  


 


MCHC  31.8  


 


RDW  12.9  


 


Plt Count  249  


 


MPV  9.6  


 


Neut % (Auto)  73.4 H  


 


Lymph % (Auto)  20.3 L  


 


Mono % (Auto)  5.5  


 


Eos % (Auto)  0.7 L  


 


Baso % (Auto)  0.1  


 


Lymph # (Auto)  1.4  


 


Mono # (Auto)  0.4  


 


Eos # (Auto)  0.1  


 


Baso # (Auto)  0.01  


 


Absolute Neuts (auto)  5.19  


 


Sodium   140 


 


Potassium   3.6 


 


Chloride   101 


 


Carbon Dioxide   30 


 


Anion Gap   13 


 


BUN   16 


 


Creatinine   0.6 L 


 


Est GFR ( Amer)   > 60 


 


Est GFR (Non-Af Amer)   > 60 


 


Random Glucose   102 


 


Calcium   9.5 


 


Magnesium   2.1 


 


Total Bilirubin   0.7 


 


AST   44 H 


 


ALT   23 


 


Alkaline Phosphatase   87 


 


Total Protein   7.3 


 


Albumin   4.1 


 


Globulin   3.3 


 


Albumin/Globulin Ratio   1.2 


 


25-OH Vitamin D Total    22.8 L














Assessment & Plan





- Assessment and Plan (Free Text)


Assessment: 





69 F with pertinent medical history of HLD, HTN, and cataracts presents s/p 

strictly mechanical fall.  Neurology consulted because initial CT Scan was read 

tohave minute possible hemorrhage near anterior falx.  This was later ruled out 

with a follow up CT Scan.  Patient has no focal neurological deficits on a 

thorough neurological physical exam.  Review of laboratory and imaging reveals 

no gross abnormalities.





Plan





Patient does not appear to have SAH.  No further intervention from a 

neurological perspective





<Albert Wells - Last Filed: 04/21/19 18:17>





Results





- Vital Signs


Recent Vital Signs: 


                                Last Vital Signs











Temp  98.6 F   04/19/19 07:00


 


Pulse  77   04/19/19 16:00


 


Resp  19   04/19/19 16:00


 


BP  119/73   04/19/19 16:00


 


Pulse Ox  98   04/19/19 15:00














- Labs


Result Diagrams: 


                                 04/19/19 05:10





                                 04/19/19 05:10





Attending/Attestation





- Attestation


I have personally seen and examined this patient.: Yes


I have fully participated in the care of the patient.: Yes


I have reviewed all pertinent clinical information: Yes


Notes (Text): 





I agree with the assessment and plan.  No further recommendations.

## 2019-04-20 NOTE — CARD
--------------- APPROVED REPORT --------------





Date of service: 04/18/2019



EKG Measurement

Heart [id number]

[id number]

[id number]

[id number]

[id number]



<Conclusion>

Normal sinus rhythm

Right bundle branch block

Abnormal ECG